# Patient Record
Sex: FEMALE | Race: WHITE | NOT HISPANIC OR LATINO | Employment: FULL TIME | URBAN - METROPOLITAN AREA
[De-identification: names, ages, dates, MRNs, and addresses within clinical notes are randomized per-mention and may not be internally consistent; named-entity substitution may affect disease eponyms.]

---

## 2019-04-16 ENCOUNTER — TRANSCRIBE ORDERS (OUTPATIENT)
Dept: ADMINISTRATIVE | Facility: HOSPITAL | Age: 54
End: 2019-04-16

## 2019-04-16 DIAGNOSIS — M70.70 OTHER BURSITIS OF HIP, UNSPECIFIED HIP: Primary | ICD-10-CM

## 2019-04-23 ENCOUNTER — HOSPITAL ENCOUNTER (OUTPATIENT)
Dept: GENERAL RADIOLOGY | Facility: HOSPITAL | Age: 54
Discharge: HOME | End: 2019-04-23

## 2019-04-23 ENCOUNTER — HOSPITAL ENCOUNTER (OUTPATIENT)
Dept: GENERAL RADIOLOGY | Facility: HOSPITAL | Age: 54
Discharge: HOME | End: 2019-04-23
Admitting: RADIOLOGY

## 2019-04-23 ENCOUNTER — HOSPITAL ENCOUNTER (OUTPATIENT)
Dept: CT IMAGING | Facility: HOSPITAL | Age: 54
Discharge: HOME | End: 2019-04-23

## 2019-04-23 VITALS
OXYGEN SATURATION: 99 % | RESPIRATION RATE: 18 BRPM | DIASTOLIC BLOOD PRESSURE: 73 MMHG | SYSTOLIC BLOOD PRESSURE: 133 MMHG | HEART RATE: 88 BPM

## 2019-04-23 VITALS
SYSTOLIC BLOOD PRESSURE: 128 MMHG | DIASTOLIC BLOOD PRESSURE: 72 MMHG | HEART RATE: 84 BPM | OXYGEN SATURATION: 100 % | RESPIRATION RATE: 20 BRPM

## 2019-04-23 DIAGNOSIS — M70.70 OTHER BURSITIS OF HIP, UNSPECIFIED HIP: ICD-10-CM

## 2019-04-23 PROCEDURE — 77012 CT SCAN FOR NEEDLE BIOPSY: CPT

## 2019-04-23 PROCEDURE — 25010000002 METHYLPREDNISOLONE PER 40 MG: Performed by: INTERNAL MEDICINE

## 2019-04-23 RX ORDER — METHYLPREDNISOLONE ACETATE 40 MG/ML
80 INJECTION, SUSPENSION INTRA-ARTICULAR; INTRALESIONAL; INTRAMUSCULAR; SOFT TISSUE ONCE
Status: COMPLETED | OUTPATIENT
Start: 2019-04-23 | End: 2019-04-23

## 2019-04-23 RX ORDER — LIDOCAINE HYDROCHLORIDE 10 MG/ML
20 INJECTION, SOLUTION INFILTRATION; PERINEURAL ONCE
Status: COMPLETED | OUTPATIENT
Start: 2019-04-23 | End: 2019-04-23

## 2019-04-23 RX ADMIN — LIDOCAINE HYDROCHLORIDE 20 ML: 10 INJECTION, SOLUTION INFILTRATION; PERINEURAL at 12:10

## 2019-04-23 RX ADMIN — LIDOCAINE HYDROCHLORIDE 20 ML: 10 INJECTION, SOLUTION INFILTRATION; PERINEURAL at 12:33

## 2019-04-23 RX ADMIN — METHYLPREDNISOLONE ACETATE 80 MG: 40 INJECTION, SUSPENSION INTRA-ARTICULAR; INTRALESIONAL; INTRAMUSCULAR; SOFT TISSUE at 12:32

## 2019-04-23 RX ADMIN — METHYLPREDNISOLONE ACETATE 80 MG: 40 INJECTION, SUSPENSION INTRA-ARTICULAR; INTRALESIONAL; INTRAMUSCULAR; SOFT TISSUE at 12:22

## 2019-05-08 ENCOUNTER — APPOINTMENT (OUTPATIENT)
Dept: GENERAL RADIOLOGY | Facility: HOSPITAL | Age: 54
End: 2019-05-08

## 2019-10-22 ENCOUNTER — TRANSCRIBE ORDERS (OUTPATIENT)
Dept: LAB | Facility: HOSPITAL | Age: 54
End: 2019-10-22

## 2019-10-22 ENCOUNTER — LAB (OUTPATIENT)
Dept: LAB | Facility: HOSPITAL | Age: 54
End: 2019-10-22

## 2019-10-22 DIAGNOSIS — A60.00 GENITAL HERPES SIMPLEX, UNSPECIFIED SITE: ICD-10-CM

## 2019-10-22 DIAGNOSIS — B00.9 DISEASE DUE TO ALPHAHERPESVIRINAE: Primary | ICD-10-CM

## 2019-10-22 DIAGNOSIS — B00.9 DISEASE DUE TO ALPHAHERPESVIRINAE: ICD-10-CM

## 2019-10-22 DIAGNOSIS — A60.04 HERPETIC VULVOVAGINITIS: ICD-10-CM

## 2019-10-22 LAB
ALBUMIN SERPL-MCNC: 4.7 G/DL (ref 3.5–5.2)
ALBUMIN/GLOB SERPL: 1.7 G/DL
ALP SERPL-CCNC: 55 U/L (ref 39–117)
ALT SERPL W P-5'-P-CCNC: 14 U/L (ref 1–33)
ANION GAP SERPL CALCULATED.3IONS-SCNC: 6 MMOL/L (ref 5–15)
AST SERPL-CCNC: 20 U/L (ref 1–32)
BASOPHILS # BLD AUTO: 0.08 10*3/MM3 (ref 0–0.2)
BASOPHILS NFR BLD AUTO: 1.3 % (ref 0–1.5)
BILIRUB SERPL-MCNC: 1 MG/DL (ref 0.2–1.2)
BUN BLD-MCNC: 14 MG/DL (ref 6–20)
BUN/CREAT SERPL: 21.9 (ref 7–25)
CALCIUM SPEC-SCNC: 9.6 MG/DL (ref 8.6–10.5)
CHLORIDE SERPL-SCNC: 101 MMOL/L (ref 98–107)
CO2 SERPL-SCNC: 34 MMOL/L (ref 22–29)
CREAT BLD-MCNC: 0.64 MG/DL (ref 0.57–1)
DEPRECATED RDW RBC AUTO: 46.6 FL (ref 37–54)
EOSINOPHIL # BLD AUTO: 0.09 10*3/MM3 (ref 0–0.4)
EOSINOPHIL NFR BLD AUTO: 1.5 % (ref 0.3–6.2)
ERYTHROCYTE [DISTWIDTH] IN BLOOD BY AUTOMATED COUNT: 13.2 % (ref 12.3–15.4)
GFR SERPL CREATININE-BSD FRML MDRD: 96 ML/MIN/1.73
GLOBULIN UR ELPH-MCNC: 2.8 GM/DL
GLUCOSE BLD-MCNC: 131 MG/DL (ref 65–99)
HCT VFR BLD AUTO: 43.8 % (ref 34–46.6)
HGB BLD-MCNC: 13.7 G/DL (ref 12–15.9)
IMM GRANULOCYTES # BLD AUTO: 0.01 10*3/MM3 (ref 0–0.05)
IMM GRANULOCYTES NFR BLD AUTO: 0.2 % (ref 0–0.5)
LYMPHOCYTES # BLD AUTO: 1.92 10*3/MM3 (ref 0.7–3.1)
LYMPHOCYTES NFR BLD AUTO: 31.8 % (ref 19.6–45.3)
MCH RBC QN AUTO: 29.9 PG (ref 26.6–33)
MCHC RBC AUTO-ENTMCNC: 31.3 G/DL (ref 31.5–35.7)
MCV RBC AUTO: 95.6 FL (ref 79–97)
MONOCYTES # BLD AUTO: 0.44 10*3/MM3 (ref 0.1–0.9)
MONOCYTES NFR BLD AUTO: 7.3 % (ref 5–12)
NEUTROPHILS # BLD AUTO: 3.5 10*3/MM3 (ref 1.7–7)
NEUTROPHILS NFR BLD AUTO: 57.9 % (ref 42.7–76)
NRBC BLD AUTO-RTO: 0 /100 WBC (ref 0–0.2)
PLATELET # BLD AUTO: 155 10*3/MM3 (ref 140–450)
PMV BLD AUTO: 12.7 FL (ref 6–12)
POTASSIUM BLD-SCNC: 4.4 MMOL/L (ref 3.5–5.2)
PROT SERPL-MCNC: 7.5 G/DL (ref 6–8.5)
RBC # BLD AUTO: 4.58 10*6/MM3 (ref 3.77–5.28)
SODIUM BLD-SCNC: 141 MMOL/L (ref 136–145)
WBC NRBC COR # BLD: 6.04 10*3/MM3 (ref 3.4–10.8)

## 2019-10-22 PROCEDURE — 80053 COMPREHEN METABOLIC PANEL: CPT

## 2019-10-22 PROCEDURE — 85025 COMPLETE CBC W/AUTO DIFF WBC: CPT

## 2019-10-22 PROCEDURE — 36415 COLL VENOUS BLD VENIPUNCTURE: CPT

## 2020-02-04 ENCOUNTER — TRANSCRIBE ORDERS (OUTPATIENT)
Dept: LAB | Facility: HOSPITAL | Age: 55
End: 2020-02-04

## 2020-02-04 ENCOUNTER — LAB (OUTPATIENT)
Dept: LAB | Facility: HOSPITAL | Age: 55
End: 2020-02-04

## 2020-02-04 DIAGNOSIS — A60.00 GENITAL HERPES SIMPLEX, UNSPECIFIED SITE: ICD-10-CM

## 2020-02-04 DIAGNOSIS — A60.04 HERPETIC VULVOVAGINITIS: ICD-10-CM

## 2020-02-04 DIAGNOSIS — B00.9 DISEASE DUE TO ALPHAHERPESVIRINAE: ICD-10-CM

## 2020-02-04 DIAGNOSIS — B00.9 DISEASE DUE TO ALPHAHERPESVIRINAE: Primary | ICD-10-CM

## 2020-02-04 LAB
ALBUMIN SERPL-MCNC: 4.3 G/DL (ref 3.5–5.2)
ALBUMIN/GLOB SERPL: 1.3 G/DL
ALP SERPL-CCNC: 62 U/L (ref 39–117)
ALT SERPL W P-5'-P-CCNC: 14 U/L (ref 1–33)
ANION GAP SERPL CALCULATED.3IONS-SCNC: 10 MMOL/L (ref 5–15)
AST SERPL-CCNC: 19 U/L (ref 1–32)
BASOPHILS # BLD AUTO: 0.05 10*3/MM3 (ref 0–0.2)
BASOPHILS NFR BLD AUTO: 1 % (ref 0–1.5)
BILIRUB SERPL-MCNC: 1 MG/DL (ref 0.2–1.2)
BUN BLD-MCNC: 14 MG/DL (ref 6–20)
BUN/CREAT SERPL: 23.3 (ref 7–25)
CALCIUM SPEC-SCNC: 9.3 MG/DL (ref 8.6–10.5)
CHLORIDE SERPL-SCNC: 100 MMOL/L (ref 98–107)
CO2 SERPL-SCNC: 27 MMOL/L (ref 22–29)
CREAT BLD-MCNC: 0.6 MG/DL (ref 0.57–1)
DEPRECATED RDW RBC AUTO: 48.1 FL (ref 37–54)
EOSINOPHIL # BLD AUTO: 0.09 10*3/MM3 (ref 0–0.4)
EOSINOPHIL NFR BLD AUTO: 1.8 % (ref 0.3–6.2)
ERYTHROCYTE [DISTWIDTH] IN BLOOD BY AUTOMATED COUNT: 13.5 % (ref 12.3–15.4)
GFR SERPL CREATININE-BSD FRML MDRD: 104 ML/MIN/1.73
GLOBULIN UR ELPH-MCNC: 3.2 GM/DL
GLUCOSE BLD-MCNC: 95 MG/DL (ref 65–99)
HCT VFR BLD AUTO: 39.8 % (ref 34–46.6)
HGB BLD-MCNC: 12.9 G/DL (ref 12–15.9)
IMM GRANULOCYTES # BLD AUTO: 0.01 10*3/MM3 (ref 0–0.05)
IMM GRANULOCYTES NFR BLD AUTO: 0.2 % (ref 0–0.5)
LYMPHOCYTES # BLD AUTO: 2 10*3/MM3 (ref 0.7–3.1)
LYMPHOCYTES NFR BLD AUTO: 39.3 % (ref 19.6–45.3)
MCH RBC QN AUTO: 31.4 PG (ref 26.6–33)
MCHC RBC AUTO-ENTMCNC: 32.4 G/DL (ref 31.5–35.7)
MCV RBC AUTO: 96.8 FL (ref 79–97)
MONOCYTES # BLD AUTO: 0.44 10*3/MM3 (ref 0.1–0.9)
MONOCYTES NFR BLD AUTO: 8.6 % (ref 5–12)
NEUTROPHILS # BLD AUTO: 2.5 10*3/MM3 (ref 1.7–7)
NEUTROPHILS NFR BLD AUTO: 49.1 % (ref 42.7–76)
NRBC BLD AUTO-RTO: 0 /100 WBC (ref 0–0.2)
PLATELET # BLD AUTO: 167 10*3/MM3 (ref 140–450)
PMV BLD AUTO: 11.8 FL (ref 6–12)
POTASSIUM BLD-SCNC: 3.8 MMOL/L (ref 3.5–5.2)
PROT SERPL-MCNC: 7.5 G/DL (ref 6–8.5)
RBC # BLD AUTO: 4.11 10*6/MM3 (ref 3.77–5.28)
SODIUM BLD-SCNC: 137 MMOL/L (ref 136–145)
WBC NRBC COR # BLD: 5.09 10*3/MM3 (ref 3.4–10.8)

## 2020-02-04 PROCEDURE — 80053 COMPREHEN METABOLIC PANEL: CPT

## 2020-02-04 PROCEDURE — 85025 COMPLETE CBC W/AUTO DIFF WBC: CPT

## 2020-02-04 PROCEDURE — 36415 COLL VENOUS BLD VENIPUNCTURE: CPT

## 2020-07-27 ENCOUNTER — TELEPHONE (OUTPATIENT)
Dept: INTERNAL MEDICINE | Facility: CLINIC | Age: 55
End: 2020-07-27

## 2020-07-27 NOTE — TELEPHONE ENCOUNTER
PATIENT STATES THAT A FRIEND (HILLARY ORBB OR ANTHONY)  REFERRED HER TO DR VALENCIA AND IS REQUESTING TO BECOME A NEW PATIENT OF HERS.    PLEASE CALL AND ADVISE [phone]

## 2020-07-27 NOTE — TELEPHONE ENCOUNTER
Spoke with Sharon and notified her Dr Mack is out of the office until 7/30/2020 and that we would call her back then.   Verbal understanding given

## 2020-08-13 ENCOUNTER — OFFICE VISIT (OUTPATIENT)
Dept: INTERNAL MEDICINE | Facility: CLINIC | Age: 55
End: 2020-08-13

## 2020-08-13 VITALS
WEIGHT: 125.25 LBS | DIASTOLIC BLOOD PRESSURE: 70 MMHG | RESPIRATION RATE: 20 BRPM | BODY MASS INDEX: 19.66 KG/M2 | HEART RATE: 72 BPM | TEMPERATURE: 98.4 F | SYSTOLIC BLOOD PRESSURE: 122 MMHG | HEIGHT: 67 IN

## 2020-08-13 DIAGNOSIS — M25.50 PAIN IN JOINT, MULTIPLE SITES: ICD-10-CM

## 2020-08-13 DIAGNOSIS — Z23 NEED FOR HEPATITIS A IMMUNIZATION: ICD-10-CM

## 2020-08-13 DIAGNOSIS — M53.3 COCCYX PAIN: ICD-10-CM

## 2020-08-13 DIAGNOSIS — Z13.6 SCREENING FOR CARDIOVASCULAR CONDITION: ICD-10-CM

## 2020-08-13 DIAGNOSIS — M54.6 CHRONIC MIDLINE THORACIC BACK PAIN: Primary | ICD-10-CM

## 2020-08-13 DIAGNOSIS — G89.29 CHRONIC MIDLINE THORACIC BACK PAIN: Primary | ICD-10-CM

## 2020-08-13 PROCEDURE — 90632 HEPA VACCINE ADULT IM: CPT | Performed by: INTERNAL MEDICINE

## 2020-08-13 PROCEDURE — 90471 IMMUNIZATION ADMIN: CPT | Performed by: INTERNAL MEDICINE

## 2020-08-13 PROCEDURE — 99203 OFFICE O/P NEW LOW 30 MIN: CPT | Performed by: INTERNAL MEDICINE

## 2020-08-13 RX ORDER — CYCLOSPORINE 0.5 MG/ML
1 EMULSION OPHTHALMIC 2 TIMES DAILY
COMMUNITY
End: 2020-11-09

## 2020-08-13 RX ORDER — FAMCICLOVIR 500 MG/1
1 TABLET ORAL DAILY
COMMUNITY

## 2020-08-13 NOTE — PROGRESS NOTES
"Subjective       Sharon Turner is a 54 y.o. female.     Chief Complaint   Patient presents with   • Back Pain     Establish care       History obtained from the patient.      History of Present Illness     The patient is establishing care.  She states that she has had recent labs with her previous PCP.    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    The patient has a history of GERD, stable on no medication.    Patient states she was diagnosed with Osteoporosis in June 2020.  She did not feel like she could tolerate oral medication, and Reclast has been ordered by an Endocrinologist.  She is awaiting insurance approval.  She takes Calcium and Vitamin D.  In addition to her Physical Therapy, she walks.    The patient is complaining of a 2-year history of pain in the perianal/rectal area.  She denies injury or trauma.  She states it \"feels like she is sitting on something\".  She states  x-rays were negative.  She saw Neurology who ordered an MRI of her spine and coccyx which were negative.  She saw Rheumatology and was diagnosed with Ischial Bursitis.  On 4/16/2019, she had CT guided bilateral ischial bursal injections without relief.  She saw Dr. Key (Colorectal Surgery).  She had a proctoscopy, colonoscopy, and defecation test which she states were all normal.  She saw GYN and reports she had a normal exam.  She also states she has had a normal CT abdomen and pelvis and a normal pelvic ultrasound.  She tried Meloxicam which caused severe stomach upset.  She also did pelvic floor physical therapy without relief.  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  She states she saw Infectious Disease  to see if this was HSV related, and they felt that it was not.    The patient states she also has Chronic Thoracic Back Pain, which started in January 2020.  She denies injury or trauma.  It does not radiate.  She states she woke up with this pain, along with neck " pain.  The neck pain resolved with PT, but the back pain has persisted.  She states she has had an x-ray and an MRI of her thoracic spine which were normal.  She complains of arthralgias of her hands, shoulders, left hip, knees.  She has myalgias of her legs.  She also has paresthesias of her hands.      Current Outpatient Medications on File Prior to Visit   Medication Sig Dispense Refill   • Calcium-Phosphorus-Vitamin D (CITRACAL +D3 PO) Take 600 mg by mouth Daily.     • COLLAGEN PO Take 3,000 mg by mouth Daily.     • cycloSPORINE (Restasis) 0.05 % ophthalmic emulsion 1 drop 2 (two) times a day.     • Ergocalciferol (VITAMIN D2 PO) Take 12.5 m by mouth 1 (One) Time Per Week.     • famciclovir (FAMVIR) 500 MG tablet 1 tablet 2 (two) times a day.     • Multiple Vitamins-Minerals (OCUVITE-LUTEIN PO) Take 1 tablet by mouth Daily.       No current facility-administered medications on file prior to visit.        Current outpatient and discharge medications have been reconciled for the patient.  Reviewed by: Mirta Mack MD        The following portions of the patient's history were reviewed and updated as appropriate: allergies, current medications, past family history, past medical history, past social history, past surgical history and problem list.    Review of Systems   Constitutional: Positive for fatigue. Negative for chills, fever and unexpected weight change.   Eyes: Negative for visual disturbance.   Respiratory: Negative for cough, shortness of breath and wheezing.    Cardiovascular: Negative for chest pain, palpitations and leg swelling.        No STEVENS, orthopnea, or claudication.   Gastrointestinal: Negative for abdominal pain, blood in stool, constipation, diarrhea, nausea and vomiting.        Denies melena.   Endocrine: Negative for polydipsia and polyuria.   Genitourinary: Positive for urgency (not new). Negative for dysuria, frequency and hematuria.   Musculoskeletal: Negative for arthralgias and  "myalgias.   Skin: Negative for rash.   Neurological: Positive for weakness and numbness (and tingling). Negative for dizziness, syncope, light-headedness and headaches.        No memory issues.   Psychiatric/Behavioral: Negative for decreased concentration and sleep disturbance. The patient is not nervous/anxious.         Denies depression.         Objective       Blood pressure 122/70, pulse 72, temperature 98.4 °F (36.9 °C), temperature source Temporal, resp. rate 20, height 170.2 cm (67\"), weight 56.8 kg (125 lb 4 oz).      Physical Exam   Constitutional: She appears well-developed and well-nourished.   Neck: Normal range of motion. Neck supple. Carotid bruit is not present. No thyromegaly present.   There is no tenderness to palpation of the cervical spine or paraspinal muscles.   Cardiovascular: Normal rate, regular rhythm, normal heart sounds and intact distal pulses. Exam reveals no gallop and no friction rub.   No murmur heard.  No peripheral edema.   Pulmonary/Chest: Effort normal and breath sounds normal.   Abdominal: Soft. Bowel sounds are normal. She exhibits no distension, no abdominal bruit and no mass. There is no hepatosplenomegaly. There is no tenderness.   No CVA tenderness.   Musculoskeletal: Normal range of motion.   There is no tenderness to palpation over the lumbar or thoracic spine or paraspinal muscles.  Straight leg raise is negative bilaterally.  There is no pain with right hip flexion, extension, abduction, and adduction.  There is no pain with left hip flexion, extension, abduction, and adduction.   Neurological: She has normal strength and normal reflexes.   Skin: No rash noted.   Psychiatric: She has a normal mood and affect.   Nursing note and vitals reviewed.      Assessment / Plan:  Sharon was seen today for back pain.    Diagnoses and all orders for this visit:    Chronic midline thoracic back pain  -     NM Bone Scan Whole Body; Future    Coccyx pain  -     NM Bone Scan Whole Body; " Future    Pain in joint, multiple sites  -     NM Bone Scan Whole Body; Future    Screening for cardiovascular condition  -     CT Cardiac Calcium Score Without Dye; Future    Need for hepatitis A immunization  -     Hepatitis A Vaccine Adult IM      Recommended Shingrix (new Shingles vaccine) at the pharmacy.    Return in about 3 months (around 11/13/2020) for Annual physical, fasting.

## 2020-08-18 PROBLEM — M81.0 OSTEOPOROSIS: Status: ACTIVE | Noted: 2020-08-18

## 2020-08-18 PROBLEM — E55.9 VITAMIN D DEFICIENCY: Status: ACTIVE | Noted: 2020-08-18

## 2020-08-18 PROBLEM — I73.00 RAYNAUD'S DISEASE: Status: ACTIVE | Noted: 2019-02-11

## 2020-08-18 PROBLEM — B00.9 HERPES SIMPLEX TYPE 2 INFECTION: Status: ACTIVE | Noted: 2020-08-18

## 2020-08-18 PROBLEM — K21.9 GERD (GASTROESOPHAGEAL REFLUX DISEASE): Status: ACTIVE | Noted: 2020-08-18

## 2020-08-18 PROBLEM — Z87.442 HISTORY OF NEPHROLITHIASIS: Status: ACTIVE | Noted: 2020-08-18

## 2020-08-26 ENCOUNTER — APPOINTMENT (OUTPATIENT)
Dept: NUCLEAR MEDICINE | Facility: HOSPITAL | Age: 55
End: 2020-08-26

## 2020-08-26 ENCOUNTER — APPOINTMENT (OUTPATIENT)
Dept: CT IMAGING | Facility: HOSPITAL | Age: 55
End: 2020-08-26

## 2020-09-02 ENCOUNTER — HOSPITAL ENCOUNTER (OUTPATIENT)
Dept: CT IMAGING | Facility: HOSPITAL | Age: 55
Discharge: HOME | End: 2020-09-02
Admitting: INTERNAL MEDICINE

## 2020-09-02 ENCOUNTER — HOSPITAL ENCOUNTER (OUTPATIENT)
Dept: NUCLEAR MEDICINE | Facility: HOSPITAL | Age: 55
Discharge: HOME | End: 2020-09-02

## 2020-09-02 DIAGNOSIS — M54.6 CHRONIC MIDLINE THORACIC BACK PAIN: ICD-10-CM

## 2020-09-02 DIAGNOSIS — Z13.6 SCREENING FOR CARDIOVASCULAR CONDITION: ICD-10-CM

## 2020-09-02 DIAGNOSIS — M25.50 PAIN IN JOINT, MULTIPLE SITES: ICD-10-CM

## 2020-09-02 DIAGNOSIS — G89.29 CHRONIC MIDLINE THORACIC BACK PAIN: ICD-10-CM

## 2020-09-02 DIAGNOSIS — M53.3 COCCYX PAIN: ICD-10-CM

## 2020-09-02 PROCEDURE — A9503 TC99M MEDRONATE: HCPCS | Performed by: INTERNAL MEDICINE

## 2020-09-02 PROCEDURE — 0 TECHNETIUM MEDRONATE KIT: Performed by: INTERNAL MEDICINE

## 2020-09-02 PROCEDURE — 75571 CT HRT W/O DYE W/CA TEST: CPT

## 2020-09-02 PROCEDURE — 78306 BONE IMAGING WHOLE BODY: CPT

## 2020-09-02 RX ORDER — TC 99M MEDRONATE 20 MG/10ML
27.1 INJECTION, POWDER, LYOPHILIZED, FOR SOLUTION INTRAVENOUS
Status: COMPLETED | OUTPATIENT
Start: 2020-09-02 | End: 2020-09-02

## 2020-09-02 RX ADMIN — Medication 27.1 MILLICURIE: at 11:05

## 2020-09-03 ENCOUNTER — LAB (OUTPATIENT)
Dept: LAB | Facility: HOSPITAL | Age: 55
End: 2020-09-03

## 2020-09-03 ENCOUNTER — TRANSCRIBE ORDERS (OUTPATIENT)
Dept: LAB | Facility: HOSPITAL | Age: 55
End: 2020-09-03

## 2020-09-03 DIAGNOSIS — M54.6 PAIN IN THORACIC SPINE: Primary | ICD-10-CM

## 2020-09-03 DIAGNOSIS — A31.0 PULMONARY DISEASE DUE TO MYCOBACTERIA (HCC): ICD-10-CM

## 2020-09-03 LAB
ALBUMIN SERPL-MCNC: 4.3 G/DL (ref 3.5–5.2)
ALBUMIN/GLOB SERPL: 1.5 G/DL
ALP SERPL-CCNC: 51 U/L (ref 39–117)
ALT SERPL W P-5'-P-CCNC: 10 U/L (ref 1–33)
ANION GAP SERPL CALCULATED.3IONS-SCNC: 8 MMOL/L (ref 5–15)
AST SERPL-CCNC: 18 U/L (ref 1–32)
BASOPHILS # BLD AUTO: 0.06 10*3/MM3 (ref 0–0.2)
BASOPHILS NFR BLD AUTO: 1 % (ref 0–1.5)
BILIRUB SERPL-MCNC: 1.2 MG/DL (ref 0–1.2)
BUN SERPL-MCNC: 18 MG/DL (ref 6–20)
BUN/CREAT SERPL: 25.4 (ref 7–25)
CALCIUM SPEC-SCNC: 9.6 MG/DL (ref 8.6–10.5)
CHLORIDE SERPL-SCNC: 99 MMOL/L (ref 98–107)
CO2 SERPL-SCNC: 31 MMOL/L (ref 22–29)
CREAT SERPL-MCNC: 0.71 MG/DL (ref 0.57–1)
DEPRECATED RDW RBC AUTO: 46.3 FL (ref 37–54)
EOSINOPHIL # BLD AUTO: 0.06 10*3/MM3 (ref 0–0.4)
EOSINOPHIL NFR BLD AUTO: 1 % (ref 0.3–6.2)
ERYTHROCYTE [DISTWIDTH] IN BLOOD BY AUTOMATED COUNT: 13 % (ref 12.3–15.4)
GFR SERPL CREATININE-BSD FRML MDRD: 85 ML/MIN/1.73
GLOBULIN UR ELPH-MCNC: 2.8 GM/DL
GLUCOSE SERPL-MCNC: 120 MG/DL (ref 65–99)
HCT VFR BLD AUTO: 40.2 % (ref 34–46.6)
HGB BLD-MCNC: 12.9 G/DL (ref 12–15.9)
IMM GRANULOCYTES # BLD AUTO: 0.01 10*3/MM3 (ref 0–0.05)
IMM GRANULOCYTES NFR BLD AUTO: 0.2 % (ref 0–0.5)
LYMPHOCYTES # BLD AUTO: 1.64 10*3/MM3 (ref 0.7–3.1)
LYMPHOCYTES NFR BLD AUTO: 28.5 % (ref 19.6–45.3)
MCH RBC QN AUTO: 31.3 PG (ref 26.6–33)
MCHC RBC AUTO-ENTMCNC: 32.1 G/DL (ref 31.5–35.7)
MCV RBC AUTO: 97.6 FL (ref 79–97)
MONOCYTES # BLD AUTO: 0.43 10*3/MM3 (ref 0.1–0.9)
MONOCYTES NFR BLD AUTO: 7.5 % (ref 5–12)
NEUTROPHILS NFR BLD AUTO: 3.55 10*3/MM3 (ref 1.7–7)
NEUTROPHILS NFR BLD AUTO: 61.8 % (ref 42.7–76)
NRBC BLD AUTO-RTO: 0 /100 WBC (ref 0–0.2)
PLATELET # BLD AUTO: 165 10*3/MM3 (ref 140–450)
PMV BLD AUTO: 12 FL (ref 6–12)
POTASSIUM SERPL-SCNC: 3.6 MMOL/L (ref 3.5–5.2)
PROT SERPL-MCNC: 7.1 G/DL (ref 6–8.5)
RBC # BLD AUTO: 4.12 10*6/MM3 (ref 3.77–5.28)
SODIUM SERPL-SCNC: 138 MMOL/L (ref 136–145)
WBC # BLD AUTO: 5.75 10*3/MM3 (ref 3.4–10.8)

## 2020-09-03 PROCEDURE — 80053 COMPREHEN METABOLIC PANEL: CPT | Performed by: INTERNAL MEDICINE

## 2020-09-03 PROCEDURE — 36415 COLL VENOUS BLD VENIPUNCTURE: CPT | Performed by: INTERNAL MEDICINE

## 2020-09-03 PROCEDURE — 85025 COMPLETE CBC W/AUTO DIFF WBC: CPT | Performed by: INTERNAL MEDICINE

## 2020-09-22 ENCOUNTER — TELEPHONE (OUTPATIENT)
Dept: INTERNAL MEDICINE | Facility: CLINIC | Age: 55
End: 2020-09-22

## 2020-09-22 NOTE — TELEPHONE ENCOUNTER
Spoke with Sharon   She is going to call Dr Lux office and see what is going on with the records she requested on 8/12/2020

## 2020-09-22 NOTE — TELEPHONE ENCOUNTER
Call patient please.  I have still not received her medical records.  Can she call her previous PCP office to see what the hold-up is?

## 2020-09-23 ENCOUNTER — TELEPHONE (OUTPATIENT)
Dept: INTERNAL MEDICINE | Facility: CLINIC | Age: 55
End: 2020-09-23

## 2020-09-23 NOTE — TELEPHONE ENCOUNTER
PATIENT CALLED DR ROBERTSON'S OFFICE AND THE MEDICAL RECORDS DEPARTMENT SYSTEM IS CURRENTLY DOWN BUT SAID THAT IF IT WAS SENT IN IN AUGUST IT WOULD HAVE BEEN PROCESSED BUT THEY WERE UNABLE TO CONFIRM.    PATIENT HAS ACCESS TO ALL OF HER MEDICAL RECORDS ONLINE AND WANTED TO KNOW IF YOU NEED HER TO PRINT ALL OF THEM OUT.    PATIENT CALL BACK NUMBER [phone]

## 2020-09-24 NOTE — TELEPHONE ENCOUNTER
She states she is going to call Dr Lux office and see if computer has come back up and check on status of records

## 2020-09-24 NOTE — TELEPHONE ENCOUNTER
Pt called and said that Dr. Lux's office never got our request for records , I refaxed to [phone] this is the # they gave pt to fax to

## 2020-09-29 ENCOUNTER — TELEPHONE (OUTPATIENT)
Dept: INTERNAL MEDICINE | Facility: CLINIC | Age: 55
End: 2020-09-29

## 2020-10-12 ENCOUNTER — TELEPHONE (OUTPATIENT)
Dept: INTERNAL MEDICINE | Facility: CLINIC | Age: 55
End: 2020-10-12

## 2020-10-12 DIAGNOSIS — M53.3 COCCYX PAIN: Primary | ICD-10-CM

## 2020-10-12 NOTE — TELEPHONE ENCOUNTER
Call patient please.  I did receive a few more of her medical records, but not all.  At this point, I would recommend scheduling a CT of her sacrum and coccyx.  I realize she has had an MRI of that area, but CAT scans tend to show bone better.  If she is agreeable, I will enter an order.

## 2020-10-21 ENCOUNTER — TELEPHONE (OUTPATIENT)
Dept: INTERNAL MEDICINE | Facility: CLINIC | Age: 55
End: 2020-10-21

## 2020-10-21 NOTE — TELEPHONE ENCOUNTER
Shannon called and states the patient's CT scan is currently needing a peer to peer review, [phone] and select option #3 CASE #[id number].    Patient is scheduled for 10/22/20 at @ 2:00 pm. Review would need approval before 3:00pm today for patient to still be seen tomorrow.     Please advise?

## 2020-10-22 ENCOUNTER — HOSPITAL ENCOUNTER (OUTPATIENT)
Dept: CT IMAGING | Facility: HOSPITAL | Age: 55
Discharge: HOME | End: 2020-10-22
Admitting: INTERNAL MEDICINE

## 2020-10-22 DIAGNOSIS — M53.3 COCCYX PAIN: ICD-10-CM

## 2020-10-22 PROCEDURE — 72131 CT LUMBAR SPINE W/O DYE: CPT

## 2020-10-23 ENCOUNTER — TELEPHONE (OUTPATIENT)
Dept: INTERNAL MEDICINE | Facility: CLINIC | Age: 55
End: 2020-10-23

## 2020-10-23 DIAGNOSIS — M53.3 COCCYX PAIN: Primary | ICD-10-CM

## 2020-10-23 NOTE — TELEPHONE ENCOUNTER
----- Message from Mirta Mack MD sent at 10/23/2020 12:45 PM EDT -----  Call patient please.  Her CT scan was normal.  I am not sure what other evaluation is left to do for her sacral/coccygeal pain.  Is she interested in a referral to [facility] or [facility]?

## 2020-10-23 NOTE — TELEPHONE ENCOUNTER
Advised pt of message and Pt stated she would like to be referred out to [facility]. Verbalized good understanding.

## 2020-11-05 ENCOUNTER — TELEPHONE (OUTPATIENT)
Dept: INTERNAL MEDICINE | Facility: CLINIC | Age: 55
End: 2020-11-05

## 2020-11-05 NOTE — TELEPHONE ENCOUNTER
PATIENT CALLED AND STATED SHE RECEIVED A CALL FROM [facility] AND THEY ARE REQUESTING THAT THE OFFICE FAX ANY IMAGINE THAT HAS BEEN DONE ON THE PATIENT.     Patient also wants her colonoscopy sent to [facility] as well    [facility] [phone]

## 2020-11-09 ENCOUNTER — OFFICE VISIT (OUTPATIENT)
Dept: INTERNAL MEDICINE | Facility: CLINIC | Age: 55
End: 2020-11-09

## 2020-11-09 VITALS
HEART RATE: 80 BPM | HEIGHT: 67 IN | DIASTOLIC BLOOD PRESSURE: 60 MMHG | RESPIRATION RATE: 20 BRPM | SYSTOLIC BLOOD PRESSURE: 110 MMHG | BODY MASS INDEX: 19.54 KG/M2 | TEMPERATURE: 98 F | WEIGHT: 124.5 LBS

## 2020-11-09 DIAGNOSIS — Z12.31 ENCOUNTER FOR SCREENING MAMMOGRAM FOR BREAST CANCER: ICD-10-CM

## 2020-11-09 DIAGNOSIS — M81.8 OTHER OSTEOPOROSIS WITHOUT CURRENT PATHOLOGICAL FRACTURE: ICD-10-CM

## 2020-11-09 DIAGNOSIS — E55.9 VITAMIN D DEFICIENCY: ICD-10-CM

## 2020-11-09 DIAGNOSIS — Z00.00 ENCOUNTER FOR HEALTH MAINTENANCE EXAMINATION IN ADULT: Primary | ICD-10-CM

## 2020-11-09 DIAGNOSIS — Z13.6 SCREENING FOR CARDIOVASCULAR CONDITION: ICD-10-CM

## 2020-11-09 DIAGNOSIS — Z01.419 ENCOUNTER FOR CERVICAL PAP SMEAR WITH PELVIC EXAM: ICD-10-CM

## 2020-11-09 DIAGNOSIS — Z11.59 NEED FOR HEPATITIS C SCREENING TEST: ICD-10-CM

## 2020-11-09 DIAGNOSIS — K21.9 GASTROESOPHAGEAL REFLUX DISEASE, UNSPECIFIED WHETHER ESOPHAGITIS PRESENT: ICD-10-CM

## 2020-11-09 LAB
25(OH)D3 SERPL-MCNC: 38.8 NG/ML (ref 30–100)
ALBUMIN SERPL-MCNC: 4.6 G/DL (ref 3.5–5.2)
ALBUMIN/GLOB SERPL: 1.6 G/DL
ALP SERPL-CCNC: 56 U/L (ref 39–117)
ALT SERPL W P-5'-P-CCNC: 11 U/L (ref 1–33)
ANION GAP SERPL CALCULATED.3IONS-SCNC: 7.8 MMOL/L (ref 5–15)
AST SERPL-CCNC: 21 U/L (ref 1–32)
BASOPHILS # BLD MANUAL: 0.12 10*3/MM3 (ref 0–0.2)
BASOPHILS NFR BLD AUTO: 2.1 % (ref 0–1.5)
BILIRUB SERPL-MCNC: 1 MG/DL (ref 0–1.2)
BUN SERPL-MCNC: 16 MG/DL (ref 6–20)
BUN/CREAT SERPL: 25 (ref 7–25)
CALCIUM SPEC-SCNC: 9.1 MG/DL (ref 8.6–10.5)
CHLORIDE SERPL-SCNC: 101 MMOL/L (ref 98–107)
CHOLEST SERPL-MCNC: 216 MG/DL (ref 0–200)
CO2 SERPL-SCNC: 29.2 MMOL/L (ref 22–29)
CREAT SERPL-MCNC: 0.64 MG/DL (ref 0.57–1)
DEPRECATED RDW RBC AUTO: 42.7 FL (ref 37–54)
EOSINOPHIL # BLD MANUAL: 0.12 10*3/MM3 (ref 0–0.4)
EOSINOPHIL NFR BLD MANUAL: 2.1 % (ref 0.3–6.2)
ERYTHROCYTE [DISTWIDTH] IN BLOOD BY AUTOMATED COUNT: 12.8 % (ref 12.3–15.4)
GFR SERPL CREATININE-BSD FRML MDRD: 96 ML/MIN/1.73
GLOBULIN UR ELPH-MCNC: 2.8 GM/DL
GLUCOSE SERPL-MCNC: 84 MG/DL (ref 65–99)
HCT VFR BLD AUTO: 40.3 % (ref 34–46.6)
HCV AB SER DONR QL: NORMAL
HDLC SERPL-MCNC: 103 MG/DL (ref 40–60)
HGB BLD-MCNC: 13.5 G/DL (ref 12–15.9)
LDLC SERPL CALC-MCNC: 107 MG/DL (ref 0–100)
LDLC/HDLC SERPL: 1.04 {RATIO}
LYMPHOCYTES # BLD MANUAL: 1.11 10*3/MM3 (ref 0.7–3.1)
LYMPHOCYTES NFR BLD MANUAL: 19.6 % (ref 19.6–45.3)
LYMPHOCYTES NFR BLD MANUAL: 6.2 % (ref 5–12)
MCH RBC QN AUTO: 30.8 PG (ref 26.6–33)
MCHC RBC AUTO-ENTMCNC: 33.5 G/DL (ref 31.5–35.7)
MCV RBC AUTO: 91.8 FL (ref 79–97)
MONOCYTES # BLD AUTO: 0.35 10*3/MM3 (ref 0.1–0.9)
NEUTROPHILS # BLD AUTO: 3.97 10*3/MM3 (ref 1.7–7)
NEUTROPHILS NFR BLD MANUAL: 70.1 % (ref 42.7–76)
PLAT MORPH BLD: NORMAL
PLATELET # BLD AUTO: 141 10*3/MM3 (ref 140–450)
PMV BLD AUTO: 13.7 FL (ref 6–12)
POTASSIUM SERPL-SCNC: 3.8 MMOL/L (ref 3.5–5.2)
PROT SERPL-MCNC: 7.4 G/DL (ref 6–8.5)
RBC # BLD AUTO: 4.39 10*6/MM3 (ref 3.77–5.28)
RBC MORPH BLD: NORMAL
SODIUM SERPL-SCNC: 138 MMOL/L (ref 136–145)
TRIGL SERPL-MCNC: 31 MG/DL (ref 0–150)
TSH SERPL DL<=0.05 MIU/L-ACNC: 1.09 UIU/ML (ref 0.27–4.2)
VLDLC SERPL-MCNC: 6 MG/DL (ref 5–40)
WBC # BLD AUTO: 5.67 10*3/MM3 (ref 3.4–10.8)
WBC MORPH BLD: NORMAL

## 2020-11-09 PROCEDURE — 82306 VITAMIN D 25 HYDROXY: CPT | Performed by: INTERNAL MEDICINE

## 2020-11-09 PROCEDURE — 80053 COMPREHEN METABOLIC PANEL: CPT | Performed by: INTERNAL MEDICINE

## 2020-11-09 PROCEDURE — 84443 ASSAY THYROID STIM HORMONE: CPT | Performed by: INTERNAL MEDICINE

## 2020-11-09 PROCEDURE — 36415 COLL VENOUS BLD VENIPUNCTURE: CPT | Performed by: INTERNAL MEDICINE

## 2020-11-09 PROCEDURE — 86803 HEPATITIS C AB TEST: CPT | Performed by: INTERNAL MEDICINE

## 2020-11-09 PROCEDURE — 85007 BL SMEAR W/DIFF WBC COUNT: CPT | Performed by: INTERNAL MEDICINE

## 2020-11-09 PROCEDURE — 85025 COMPLETE CBC W/AUTO DIFF WBC: CPT | Performed by: INTERNAL MEDICINE

## 2020-11-09 PROCEDURE — 99396 PREV VISIT EST AGE 40-64: CPT | Performed by: INTERNAL MEDICINE

## 2020-11-09 PROCEDURE — 80061 LIPID PANEL: CPT | Performed by: INTERNAL MEDICINE

## 2020-11-09 NOTE — PROGRESS NOTES
Subjective     Chief Complaint:  Physical Exam.    History of Present Illness    History obtained from the patient.    The patient has been referred to the [facility] to evaluate her Chronic Sacral/Coccygeal/Perineal Pain, etiology undetermined despite extensive work-up.  Since last visit, the patient has had a negative bone scan and CT of the sacrum/coccyx.  The patient states her pain is still severe.  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     The patient has a history of GERD, stable on no medication.  She states her GERD flares with antibiotics or other medications.  When that happens, she takes Prilosec over-the-counter for 2 weeks with resolution.  She denies current GI symptoms, and states that she has not had a flare for at least 6 months.    The patient had a Colonoscopy on 5/10/2019 which was negative except for mild Sigmoid Diverticulosis.  Preoperative diagnosis listed history of Colon Polyps.  When questioned regarding this, the patient denies a history of Colon Polyps.     The patient states was diagnosed with Osteoporosis in June 2020.    DEXA scan showed Osteoporosis of both the L-spine and hip per patient report.  She did not feel like she could tolerate oral medication, and Reclast was ordered by her Endocrinologist, but it was not covered by her insurance.  Prolia was discussed, but the patient would need to take oral medication first. She takes Calcium 600 mg with Vitamin D, a Multivitamin (Ocuvite-Lutein), and Vitamin D2 50,000  IU p.o. once monthly.  She has thoracic back pain which has improved significantly with Cortisone injections done at [facility].  She has an appointment next week to discuss her right shoulder pain.  She reports generalized myalgias/arthralgias and intermittent neck pain.  She denies paresthesias, loss of balance, and gait  instability.    The patient states she is seeing an Ophthalmologist at [facility] for deterioration of the vitreous fluid in both eyes, diagnosed in July 2020.  She reports blurred vision, floaters, and burning of the eyes.      Sharon Turner is a 55 y.o. female who presents for an Annual Physical.      The patient presents for a Pap Smear.  She is G [1 ]  P [1 ].  She is menopausal.  Last menstrual period was [2006]. She [denies] dyspareunia.  She [is not currently] sexually active.   Her last Pap smear was [10/10/2018, normal].  She [reports one] abnormal Pap Smears in the past, more than 20 years ago.  She states she had a cone biopsy and cryotherapy without recurrence.  She has HSV2.  She [denies] other sexually-transmitted diseases and HPV in the past.  She [denies] JOHNSON Exposure in utero.      She [does] do self breast exam.  She [denies) breast mass, breast pain, nipple inversion, or nipple discharge.  Last Mammogram was [October 2019, normal per patient].  Last DEXA Scan was June 2020, Osteoporosis].  Last Colonoscopy was [5/10/2019, mild sigmoid diverticulosis].    She [takes] a daily Multivitamin and  [takes] daily Calcium / Vitamin D.      Her paternal uncle had Colon Cancer.  She [denies] a family history of  Colon Polyps, Breast Cancer, Uterine Cancer, Cervical Cancer, or Ovarian Cancer.      PMH, PSH, SocHx, FamHx, Allergies, and Medications: Reviewed and updated.    Outpatient Medications Prior to Visit   Medication Sig Dispense Refill   • Calcium-Phosphorus-Vitamin D (CITRACAL +D3 PO) Take 600 mg by mouth Daily.     • COLLAGEN PO Take 3,000 mg by mouth Daily.     • Ergocalciferol (VITAMIN D2 PO) Take 50,000 Units by mouth Every 30 (Thirty) Days.     • famciclovir (FAMVIR) 500 MG tablet 1 tablet 2 (two) times a day.     • Multiple Vitamins-Minerals (OCUVITE-LUTEIN PO) Take 1 tablet by mouth Daily.     • cycloSPORINE (Restasis) 0.05 % ophthalmic emulsion 1 drop 2 (two) times a day.       No  facility-administered medications prior to visit.        Immunization History   Administered Date(s) Administered   • Flu Vaccine Quad PF >36MO 10/30/2020   • Hepatitis A 08/13/2020   • Tdap 05/14/2008, 10/07/2019         Patient Active Problem List   Diagnosis   • Raynaud's disease   • GERD (gastroesophageal reflux disease)   • Vitamin D deficiency   • History of nephrolithiasis   • Herpes simplex type 2 infection   • Osteoporosis       Health Habits:  Dental Exam. up to date  Eye Exam. up to date  Hearing Loss:  No  Exercise: 7 times/week.  Current exercise activities include: walking  Diet: Healthy  Multivitamin: Yes    Safe Driving:  Yes  Seat Belt:  Yes  Bike Helmet:  N/A  Skin Screening:  Yes  Sunscreen: Yes  SBE / DEVONTE: Yes  Sexual Activity:  Not currently  Birth Control:  Menopause  STD Prevention:  N/A    Last Pap: 10/10/2018, normal  Last Mammogram: October 2019, normal per patient report  Last DEXA Scan: June 2020, Osteoporosis of both the L-spine and hip per patient report.   Last Colonoscopy: 5/10/2019, mild sigmoid diverticulosis  Last PSA: N/A    Social:    Social History     Socioeconomic History   • Marital status:      Spouse name: Not on file   • Number of children: 1   • Years of education: Not on file   • Highest education level: Not on file   Occupational History   • Occupation: Nurse     Comment: full time   Tobacco Use   • Smoking status: Never Smoker   • Smokeless tobacco: Never Used   Substance and Sexual Activity   • Alcohol use: Yes     Comment: 1-2 drinks (beer or cocktail) per week   • Drug use: Never   • Sexual activity: Not Currently     Partners: Male     Birth control/protection: Post-menopausal         Current Medical Providers:    Mirta Mack MD (Internal Medicine / Pediatrics)    The [facility] providers who are involved in the care of this patient are listed above.         Review of Systems   Constitutional: Negative for chills, fatigue, fever and unexpected weight  change.        No night sweats.    HENT: Negative for congestion, ear pain, hearing loss, nosebleeds, postnasal drip, rhinorrhea, sinus pressure, sinus pain, sneezing, sore throat, tinnitus and voice change.         Denies snoring.   Eyes: Positive for pain (burning) and visual disturbance. Negative for photophobia, discharge, redness and itching.   Respiratory: Negative for cough, chest tightness, shortness of breath, wheezing and stridor.         No chest congestion.  No hemoptysis.   Cardiovascular: Negative for chest pain, palpitations and leg swelling.        No orthopnea, STEVENS, or PND.  No claudication or syncope.   Gastrointestinal: Negative for abdominal pain, blood in stool, constipation, diarrhea, nausea, rectal pain and vomiting.        No melena.  No hematemesis.  No heartburn, dysphagia or odynophagia.  No early satiety, belching, or bloating.    Endocrine: Negative for cold intolerance, heat intolerance, polydipsia, polyphagia and polyuria.        No hair loss or dry skin.  No hot flashes.     Genitourinary: Positive for pelvic pain (occasional left). Negative for difficulty urinating, dyspareunia, dysuria, flank pain, frequency, hematuria, urgency, vaginal bleeding and vaginal discharge.        No nocturia, incomplete emptying, or incontinence.   Musculoskeletal: Positive for arthralgias, back pain, myalgias and neck pain. Negative for gait problem, joint swelling and neck stiffness.        No joint stiffness.   Skin: Negative for rash.        No new skin lesions or changes in skin lesions. No breast pain or masses.  No nipple discharge or nipple inversion.   Neurological: Negative for dizziness, tremors, syncope, speech difficulty, weakness, light-headedness, numbness and headaches.        No tingling.  No memory loss.  No decreased concentration.   Hematological: Negative for adenopathy. Does not bruise/bleed easily.   Psychiatric/Behavioral: Negative for confusion, sleep disturbance and suicidal  "ideas. The patient is not nervous/anxious.         No depression.           Objective     Vitals:    11/09/20 0952   BP: 110/60   BP Location: Right arm   Pulse: 80   Resp: 20   Temp: 98 °F (36.7 °C)   TempSrc: Temporal   Weight: 56.5 kg (124 lb 8 oz)   Height: 170.8 cm (67.25\")       Body mass index is 19.35 kg/m².    Physical Exam  Vitals signs and nursing note reviewed. Exam conducted with a chaperone present.   Constitutional:       Appearance: Normal appearance. She is well-developed and normal weight.   HENT:      Head: Normocephalic and atraumatic.      Right Ear: Tympanic membrane, ear canal and external ear normal.      Left Ear: Tympanic membrane, ear canal and external ear normal.      Mouth/Throat:      Mouth: Mucous membranes are moist. No oral lesions.      Pharynx: Oropharynx is clear. No posterior oropharyngeal erythema or uvula swelling.      Comments: Tonsils normal.  Eyes:      General: Lids are normal.      Extraocular Movements: Extraocular movements intact.      Conjunctiva/sclera: Conjunctivae normal.      Pupils: Pupils are equal, round, and reactive to light.   Neck:      Musculoskeletal: Normal range of motion and neck supple.      Thyroid: No thyroid mass or thyromegaly.      Vascular: No carotid bruit.   Cardiovascular:      Rate and Rhythm: Normal rate and regular rhythm.      Pulses: Normal pulses.      Heart sounds: No murmur. No friction rub. No gallop.    Pulmonary:      Effort: Pulmonary effort is normal.      Breath sounds: Normal breath sounds.   Chest:      Breasts:         Right: No inverted nipple, mass, nipple discharge, skin change or tenderness.         Left: No inverted nipple, mass, nipple discharge, skin change or tenderness.   Abdominal:      General: Bowel sounds are normal. There is no distension or abdominal bruit.      Palpations: Abdomen is soft. There is no hepatomegaly, splenomegaly or mass.      Tenderness: There is no abdominal tenderness.      Comments: Rectal " exam deferred.   Genitourinary:     Exam position: Prone.      Labia:         Right: No rash or lesion.         Left: No rash or lesion.       Vagina: Normal. No vaginal discharge.      Cervix: No cervical motion tenderness, discharge or friability.      Uterus: Normal. Not tender.       Adnexa:         Right: No mass or tenderness.          Left: No mass or tenderness.        Comments: Cervix is without lesions or erythema.  No uterine masses.  Musculoskeletal: Normal range of motion.         General: No tenderness.      Right lower leg: No edema.      Left lower leg: No edema.   Lymphadenopathy:      Cervical: No cervical adenopathy.      Upper Body:      Right upper body: No supraclavicular or axillary adenopathy.      Left upper body: No supraclavicular or axillary adenopathy.      Lower Body: No right inguinal adenopathy. No left inguinal adenopathy.   Skin:     Findings: No lesion or rash.      Comments: No atypical skin lesions.   Neurological:      Mental Status: She is alert.      Cranial Nerves: Cranial nerves are intact.      Motor: Motor function is intact. No abnormal muscle tone.      Coordination: Coordination is intact.      Gait: Gait normal.      Deep Tendon Reflexes: Reflexes are normal and symmetric.   Psychiatric:         Mood and Affect: Mood normal.         PHQ-2 Depression Screening  Little interest or pleasure in doing things? 0   Feeling down, depressed, or hopeless? 0   PHQ-2 Total Score 0         Counseling was given to patient for the following topics:  appropriate exercise, healthy eating habits, disease prevention, risk factors for cancer, importance of self breast exam and breast health, importance of immunizations, including risks and benefits, bone health, sun safety, seatbelt use and safe driving. Also discussed the importance of regular dental and vision care, as well recommendation for a yearly screening skin exam after age 40.  Written information provided to patient on these  topics and other health maintenance issues.      Assessment/Plan       Diagnoses and all orders for this visit:    1. Encounter for health maintenance examination in adult (Primary)  -     Lipid Panel  -     Comprehensive Metabolic Panel  -     TSH  -     Vitamin D 25 Hydroxy  -     CBC & Differential  -     Hepatitis C Antibody  -     CBC Auto Differential    2. Encounter for cervical Pap smear with pelvic exam  -     Liquid-based Pap Smear, Screening; Future    3. Gastroesophageal reflux disease, unspecified whether esophagitis present   Stable, no medication.    4. Other osteoporosis without current pathological fracture  -     Vitamin D 25 Hydroxy   Continue Calcium and Vitamin D supplementation, as well as weight bearing exercises.    5. Vitamin D deficiency  -     Vitamin D 25 Hydroxy   Continue Vitamin D supplementation.    6. Screening for cardiovascular condition  -     Lipid Panel  -     Comprehensive Metabolic Panel  -     TSH  -     Vitamin D 25 Hydroxy  -     CBC & Differential  -     CBC Auto Differential    7. Need for hepatitis C screening test  -     Hepatitis C Antibody    8. Encounter for screening mammogram for breast cancer  -     Mammo Screening Digital Tomosynthesis Bilateral With CAD; Future        Recommended Shingrix (new Shingles vaccine) vaccination at the pharmacy.      Return in about 1 year (around 11/9/2021) for Annual physical, fasting.

## 2020-11-09 NOTE — PATIENT INSTRUCTIONS
I recommend Shingrix (new Shingles vaccine) vaccination at the pharmacy.      Health Maintenance for Postmenopausal Women  Menopause is a normal process in which your ability to get pregnant comes to an end. This process happens slowly over many months or years, usually between the ages of 48 and 55. Menopause is complete when you have missed your menstrual periods for 12 months.  It is important to talk with your health care provider about some of the most common conditions that affect women after menopause (postmenopausal women). These include heart disease, cancer, and bone loss (osteoporosis). Adopting a healthy lifestyle and getting preventive care can help to promote your health and wellness. The actions you take can also lower your chances of developing some of these common conditions.  What should I know about menopause?  During menopause, you may get a number of symptoms, such as:  · Hot flashes. These can be moderate or severe.  · Night sweats.  · Decrease in sex drive.  · Mood swings.  · Headaches.  · Tiredness.  · Irritability.  · Memory problems.  · Insomnia.  Choosing to treat or not to treat these symptoms is a decision that you make with your health care provider.  Do I need hormone replacement therapy?  · Hormone replacement therapy is effective in treating symptoms that are caused by menopause, such as hot flashes and night sweats.  · Hormone replacement carries certain risks, especially as you become older. If you are thinking about using estrogen or estrogen with progestin, discuss the benefits and risks with your health care provider.  What is my risk for heart disease and stroke?  The risk of heart disease, heart attack, and stroke increases as you age. One of the causes may be a change in the body's hormones during menopause. This can affect how your body uses dietary fats, triglycerides, and cholesterol. Heart attack and stroke are medical emergencies. There are many things that you can do to  help prevent heart disease and stroke.  Watch your blood pressure  · High blood pressure causes heart disease and increases the risk of stroke. This is more likely to develop in people who have high blood pressure readings, are of  descent, or are overweight.  · Have your blood pressure checked:  ? Every 3-5 years if you are 18-39 years of age.  ? Every year if you are 40 years old or older.  Eat a healthy diet    · Eat a diet that includes plenty of vegetables, fruits, low-fat dairy products, and lean protein.  · Do not eat a lot of foods that are high in solid fats, added sugars, or sodium.  Get regular exercise  Get regular exercise. This is one of the most important things you can do for your health. Most adults should:  · Try to exercise for at least 150 minutes each week. The exercise should increase your heart rate and make you sweat (moderate-intensity exercise).  · Try to do strengthening exercises at least twice each week. Do these in addition to the moderate-intensity exercise.  · Spend less time sitting. Even light physical activity can be beneficial.  Other tips  · Work with your health care provider to achieve or maintain a healthy weight.  · Do not use any products that contain nicotine or tobacco, such as cigarettes, e-cigarettes, and chewing tobacco. If you need help quitting, ask your health care provider.  · Know your numbers. Ask your health care provider to check your cholesterol and your blood sugar (glucose). Continue to have your blood tested as directed by your health care provider.  Do I need screening for cancer?  Depending on your health history and family history, you may need to have cancer screening at different stages of your life. This may include screening for:  · Breast cancer.  · Cervical cancer.  · Lung cancer.  · Colorectal cancer.  What is my risk for osteoporosis?  After menopause, you may be at increased risk for osteoporosis. Osteoporosis is a condition in which bone  destruction happens more quickly than new bone creation. To help prevent osteoporosis or the bone fractures that can happen because of osteoporosis, you may take the following actions:  · If you are 19-50 years old, get at least 1,000 mg of calcium and at least 600 mg of vitamin D per day.  · If you are older than age 50 but younger than age 70, get at least 1,200 mg of calcium and at least 600 mg of vitamin D per day.  · If you are older than age 70, get at least 1,200 mg of calcium and at least 800 mg of vitamin D per day.  Smoking and drinking excessive alcohol increase the risk of osteoporosis. Eat foods that are rich in calcium and vitamin D, and do weight-bearing exercises several times each week as directed by your health care provider.  How does menopause affect my mental health?  Depression may occur at any age, but it is more common as you become older. Common symptoms of depression include:  · Low or sad mood.  · Changes in sleep patterns.  · Changes in appetite or eating patterns.  · Feeling an overall lack of motivation or enjoyment of activities that you previously enjoyed.  · Frequent crying spells.  Talk with your health care provider if you think that you are experiencing depression.  General instructions  See your health care provider for regular wellness exams and vaccines. This may include:  · Scheduling regular health, dental, and eye exams.  · Getting and maintaining your vaccines. These include:  ? Influenza vaccine. Get this vaccine each year before the flu season begins.  ? Pneumonia vaccine.  ? Shingles vaccine.  ? Tetanus, diphtheria, and pertussis (Tdap) booster vaccine.  Your health care provider may also recommend other immunizations.  Tell your health care provider if you have ever been abused or do not feel safe at home.  Summary  · Menopause is a normal process in which your ability to get pregnant comes to an end.  · This condition causes hot flashes, night sweats, decreased  interest in sex, mood swings, headaches, or lack of sleep.  · Treatment for this condition may include hormone replacement therapy.  · Take actions to keep yourself healthy, including exercising regularly, eating a healthy diet, watching your weight, and checking your blood pressure and blood sugar levels.  · Get screened for cancer and depression. Make sure that you are up to date with all your vaccines.  This information is not intended to replace advice given to you by your health care provider. Make sure you discuss any questions you have with your health care provider.  Document Released: 02/09/2007 Document Revised: 12/11/2019 Document Reviewed: 12/11/2019  [employer] Patient Education © 2020 [employer]

## 2020-12-17 DIAGNOSIS — Z01.419 ENCOUNTER FOR CERVICAL PAP SMEAR WITH PELVIC EXAM: ICD-10-CM

## 2021-01-23 ENCOUNTER — HOSPITAL ENCOUNTER (OUTPATIENT)
Dept: MAMMOGRAPHY | Facility: HOSPITAL | Age: 56
Discharge: HOME | End: 2021-01-23
Admitting: INTERNAL MEDICINE

## 2021-01-23 DIAGNOSIS — Z12.31 ENCOUNTER FOR SCREENING MAMMOGRAM FOR BREAST CANCER: ICD-10-CM

## 2021-01-23 PROCEDURE — 77063 BREAST TOMOSYNTHESIS BI: CPT | Performed by: RADIOLOGY

## 2021-01-23 PROCEDURE — 77063 BREAST TOMOSYNTHESIS BI: CPT

## 2021-01-23 PROCEDURE — 77067 SCR MAMMO BI INCL CAD: CPT

## 2021-01-23 PROCEDURE — 77067 SCR MAMMO BI INCL CAD: CPT | Performed by: RADIOLOGY

## 2021-03-04 ENCOUNTER — TRANSCRIBE ORDERS (OUTPATIENT)
Dept: LAB | Facility: HOSPITAL | Age: 56
End: 2021-03-04

## 2021-03-04 ENCOUNTER — LAB (OUTPATIENT)
Dept: LAB | Facility: HOSPITAL | Age: 56
End: 2021-03-04

## 2021-03-04 DIAGNOSIS — A60.00 GENITAL HERPES SIMPLEX, UNSPECIFIED SITE: ICD-10-CM

## 2021-03-04 DIAGNOSIS — A60.04 HERPETIC VULVOVAGINITIS: ICD-10-CM

## 2021-03-04 DIAGNOSIS — B00.9 DISEASE DUE TO ALPHAHERPESVIRINAE: Primary | ICD-10-CM

## 2021-03-04 DIAGNOSIS — B00.9 DISEASE DUE TO ALPHAHERPESVIRINAE: ICD-10-CM

## 2021-03-04 LAB
ALBUMIN SERPL-MCNC: 4.5 G/DL (ref 3.5–5.2)
ALBUMIN/GLOB SERPL: 1.7 G/DL
ALP SERPL-CCNC: 55 U/L (ref 39–117)
ALT SERPL W P-5'-P-CCNC: 14 U/L (ref 1–33)
ANION GAP SERPL CALCULATED.3IONS-SCNC: 6 MMOL/L (ref 5–15)
AST SERPL-CCNC: 21 U/L (ref 1–32)
BASOPHILS # BLD AUTO: 0.07 10*3/MM3 (ref 0–0.2)
BASOPHILS NFR BLD AUTO: 0.9 % (ref 0–1.5)
BILIRUB SERPL-MCNC: 1.2 MG/DL (ref 0–1.2)
BUN SERPL-MCNC: 21 MG/DL (ref 6–20)
BUN/CREAT SERPL: 30.4 (ref 7–25)
CALCIUM SPEC-SCNC: 9.5 MG/DL (ref 8.6–10.5)
CHLORIDE SERPL-SCNC: 99 MMOL/L (ref 98–107)
CO2 SERPL-SCNC: 31 MMOL/L (ref 22–29)
CREAT SERPL-MCNC: 0.69 MG/DL (ref 0.57–1)
DEPRECATED RDW RBC AUTO: 48.3 FL (ref 37–54)
EOSINOPHIL # BLD AUTO: 0.13 10*3/MM3 (ref 0–0.4)
EOSINOPHIL NFR BLD AUTO: 1.7 % (ref 0.3–6.2)
ERYTHROCYTE [DISTWIDTH] IN BLOOD BY AUTOMATED COUNT: 13.5 % (ref 12.3–15.4)
GFR SERPL CREATININE-BSD FRML MDRD: 88 ML/MIN/1.73
GLOBULIN UR ELPH-MCNC: 2.6 GM/DL
GLUCOSE SERPL-MCNC: 97 MG/DL (ref 65–99)
HCT VFR BLD AUTO: 40.7 % (ref 34–46.6)
HGB BLD-MCNC: 12.9 G/DL (ref 12–15.9)
IMM GRANULOCYTES # BLD AUTO: 0.02 10*3/MM3 (ref 0–0.05)
IMM GRANULOCYTES NFR BLD AUTO: 0.3 % (ref 0–0.5)
LYMPHOCYTES # BLD AUTO: 1.85 10*3/MM3 (ref 0.7–3.1)
LYMPHOCYTES NFR BLD AUTO: 24.4 % (ref 19.6–45.3)
MCH RBC QN AUTO: 30.9 PG (ref 26.6–33)
MCHC RBC AUTO-ENTMCNC: 31.7 G/DL (ref 31.5–35.7)
MCV RBC AUTO: 97.4 FL (ref 79–97)
MONOCYTES # BLD AUTO: 0.45 10*3/MM3 (ref 0.1–0.9)
MONOCYTES NFR BLD AUTO: 5.9 % (ref 5–12)
NEUTROPHILS NFR BLD AUTO: 5.06 10*3/MM3 (ref 1.7–7)
NEUTROPHILS NFR BLD AUTO: 66.8 % (ref 42.7–76)
NRBC BLD AUTO-RTO: 0 /100 WBC (ref 0–0.2)
PLATELET # BLD AUTO: 165 10*3/MM3 (ref 140–450)
PMV BLD AUTO: 11.9 FL (ref 6–12)
POTASSIUM SERPL-SCNC: 3.8 MMOL/L (ref 3.5–5.2)
PROT SERPL-MCNC: 7.1 G/DL (ref 6–8.5)
RBC # BLD AUTO: 4.18 10*6/MM3 (ref 3.77–5.28)
SODIUM SERPL-SCNC: 136 MMOL/L (ref 136–145)
WBC # BLD AUTO: 7.58 10*3/MM3 (ref 3.4–10.8)

## 2021-03-04 PROCEDURE — 80053 COMPREHEN METABOLIC PANEL: CPT

## 2021-03-04 PROCEDURE — 36415 COLL VENOUS BLD VENIPUNCTURE: CPT

## 2021-03-04 PROCEDURE — 85025 COMPLETE CBC W/AUTO DIFF WBC: CPT

## 2021-09-07 ENCOUNTER — TRANSCRIBE ORDERS (OUTPATIENT)
Dept: LAB | Facility: HOSPITAL | Age: 56
End: 2021-09-07

## 2021-09-07 ENCOUNTER — LAB (OUTPATIENT)
Dept: LAB | Facility: HOSPITAL | Age: 56
End: 2021-09-07

## 2021-09-07 DIAGNOSIS — A60.04 HERPETIC VULVOVAGINITIS: ICD-10-CM

## 2021-09-07 DIAGNOSIS — A60.00 GENITAL HERPES SIMPLEX, UNSPECIFIED SITE: ICD-10-CM

## 2021-09-07 DIAGNOSIS — G54.1 LUMBOSACRAL PLEXUS LESION: Primary | ICD-10-CM

## 2021-09-07 DIAGNOSIS — B00.9 DISEASE DUE TO ALPHAHERPESVIRINAE: ICD-10-CM

## 2021-09-07 DIAGNOSIS — G54.1 LUMBOSACRAL PLEXUS LESION: ICD-10-CM

## 2021-09-07 LAB
ALBUMIN SERPL-MCNC: 4.5 G/DL (ref 3.5–5.2)
ALBUMIN/GLOB SERPL: 1.7 G/DL
ALP SERPL-CCNC: 56 U/L (ref 39–117)
ALT SERPL W P-5'-P-CCNC: 11 U/L (ref 1–33)
ANION GAP SERPL CALCULATED.3IONS-SCNC: 10 MMOL/L (ref 5–15)
AST SERPL-CCNC: 17 U/L (ref 1–32)
BASOPHILS # BLD AUTO: 0.05 10*3/MM3 (ref 0–0.2)
BASOPHILS NFR BLD AUTO: 0.8 % (ref 0–1.5)
BILIRUB SERPL-MCNC: 1 MG/DL (ref 0–1.2)
BUN SERPL-MCNC: 19 MG/DL (ref 6–20)
BUN/CREAT SERPL: 25.3 (ref 7–25)
CALCIUM SPEC-SCNC: 9.6 MG/DL (ref 8.6–10.5)
CHLORIDE SERPL-SCNC: 100 MMOL/L (ref 98–107)
CO2 SERPL-SCNC: 28 MMOL/L (ref 22–29)
CREAT SERPL-MCNC: 0.75 MG/DL (ref 0.57–1)
DEPRECATED RDW RBC AUTO: 47.8 FL (ref 37–54)
EOSINOPHIL # BLD AUTO: 0.06 10*3/MM3 (ref 0–0.4)
EOSINOPHIL NFR BLD AUTO: 0.9 % (ref 0.3–6.2)
ERYTHROCYTE [DISTWIDTH] IN BLOOD BY AUTOMATED COUNT: 13.5 % (ref 12.3–15.4)
GFR SERPL CREATININE-BSD FRML MDRD: 80 ML/MIN/1.73
GLOBULIN UR ELPH-MCNC: 2.7 GM/DL
GLUCOSE SERPL-MCNC: 92 MG/DL (ref 65–99)
HCT VFR BLD AUTO: 42.1 % (ref 34–46.6)
HGB BLD-MCNC: 13.7 G/DL (ref 12–15.9)
IMM GRANULOCYTES # BLD AUTO: 0.01 10*3/MM3 (ref 0–0.05)
IMM GRANULOCYTES NFR BLD AUTO: 0.2 % (ref 0–0.5)
LYMPHOCYTES # BLD AUTO: 1.94 10*3/MM3 (ref 0.7–3.1)
LYMPHOCYTES NFR BLD AUTO: 30 % (ref 19.6–45.3)
MCH RBC QN AUTO: 30.9 PG (ref 26.6–33)
MCHC RBC AUTO-ENTMCNC: 32.5 G/DL (ref 31.5–35.7)
MCV RBC AUTO: 95 FL (ref 79–97)
MONOCYTES # BLD AUTO: 0.47 10*3/MM3 (ref 0.1–0.9)
MONOCYTES NFR BLD AUTO: 7.3 % (ref 5–12)
NEUTROPHILS NFR BLD AUTO: 3.93 10*3/MM3 (ref 1.7–7)
NEUTROPHILS NFR BLD AUTO: 60.8 % (ref 42.7–76)
NRBC BLD AUTO-RTO: 0 /100 WBC (ref 0–0.2)
PLATELET # BLD AUTO: 164 10*3/MM3 (ref 140–450)
PMV BLD AUTO: 12.7 FL (ref 6–12)
POTASSIUM SERPL-SCNC: 4 MMOL/L (ref 3.5–5.2)
PROT SERPL-MCNC: 7.2 G/DL (ref 6–8.5)
RBC # BLD AUTO: 4.43 10*6/MM3 (ref 3.77–5.28)
SODIUM SERPL-SCNC: 138 MMOL/L (ref 136–145)
WBC # BLD AUTO: 6.46 10*3/MM3 (ref 3.4–10.8)

## 2021-09-07 PROCEDURE — 80053 COMPREHEN METABOLIC PANEL: CPT

## 2021-09-07 PROCEDURE — 36415 COLL VENOUS BLD VENIPUNCTURE: CPT

## 2021-09-07 PROCEDURE — 85025 COMPLETE CBC W/AUTO DIFF WBC: CPT

## 2021-11-08 ENCOUNTER — TELEPHONE (OUTPATIENT)
Dept: INTERNAL MEDICINE | Facility: CLINIC | Age: 56
End: 2021-11-08

## 2021-11-08 NOTE — TELEPHONE ENCOUNTER
Patient states her annual physical appt isn't until 2:30pm and she would like the labs to be placed prior to her appt since she will need to be fasting.

## 2021-11-09 NOTE — TELEPHONE ENCOUNTER
Spoke to patient mentioned message being forwarded to  when she returns to office next week. Patient has had no new problems since last visit. Patient requested to have Vitamin D levels checked. Verbal understanding given.

## 2021-11-15 DIAGNOSIS — M81.8 OTHER OSTEOPOROSIS WITHOUT CURRENT PATHOLOGICAL FRACTURE: ICD-10-CM

## 2021-11-15 DIAGNOSIS — Z13.6 SCREENING FOR CARDIOVASCULAR CONDITION: ICD-10-CM

## 2021-11-15 DIAGNOSIS — E55.9 VITAMIN D DEFICIENCY: ICD-10-CM

## 2021-11-15 DIAGNOSIS — Z00.00 ENCOUNTER FOR HEALTH MAINTENANCE EXAMINATION IN ADULT: Primary | ICD-10-CM

## 2021-11-15 NOTE — TELEPHONE ENCOUNTER
Tried to call patient no answer LVM advising patient that orders were placed and she can come in anytime Monday thru Friday between 8-4 to have labs done.

## 2021-11-22 DIAGNOSIS — M79.676 PAIN AND SWELLING OF TOE, UNSPECIFIED LATERALITY: Primary | ICD-10-CM

## 2021-11-22 DIAGNOSIS — M79.89 PAIN AND SWELLING OF TOE, UNSPECIFIED LATERALITY: Primary | ICD-10-CM

## 2021-12-08 ENCOUNTER — LAB (OUTPATIENT)
Dept: LAB | Facility: HOSPITAL | Age: 56
End: 2021-12-08

## 2021-12-08 ENCOUNTER — OFFICE VISIT (OUTPATIENT)
Dept: INTERNAL MEDICINE | Facility: CLINIC | Age: 56
End: 2021-12-08

## 2021-12-08 VITALS
SYSTOLIC BLOOD PRESSURE: 124 MMHG | RESPIRATION RATE: 18 BRPM | HEART RATE: 76 BPM | WEIGHT: 124.5 LBS | DIASTOLIC BLOOD PRESSURE: 77 MMHG | BODY MASS INDEX: 19.54 KG/M2 | TEMPERATURE: 97.7 F | HEIGHT: 67 IN

## 2021-12-08 DIAGNOSIS — M81.8 OTHER OSTEOPOROSIS WITHOUT CURRENT PATHOLOGICAL FRACTURE: ICD-10-CM

## 2021-12-08 DIAGNOSIS — K21.9 GASTROESOPHAGEAL REFLUX DISEASE, UNSPECIFIED WHETHER ESOPHAGITIS PRESENT: ICD-10-CM

## 2021-12-08 DIAGNOSIS — Z13.6 SCREENING FOR CARDIOVASCULAR CONDITION: ICD-10-CM

## 2021-12-08 DIAGNOSIS — E55.9 VITAMIN D DEFICIENCY: ICD-10-CM

## 2021-12-08 DIAGNOSIS — M79.676 PAIN AND SWELLING OF TOE, UNSPECIFIED LATERALITY: ICD-10-CM

## 2021-12-08 DIAGNOSIS — Z00.00 ENCOUNTER FOR HEALTH MAINTENANCE EXAMINATION IN ADULT: ICD-10-CM

## 2021-12-08 DIAGNOSIS — Z00.00 ENCOUNTER FOR HEALTH MAINTENANCE EXAMINATION IN ADULT: Primary | ICD-10-CM

## 2021-12-08 DIAGNOSIS — Z23 NEED FOR HEPATITIS A IMMUNIZATION: ICD-10-CM

## 2021-12-08 DIAGNOSIS — M79.89 PAIN AND SWELLING OF TOE, UNSPECIFIED LATERALITY: ICD-10-CM

## 2021-12-08 PROCEDURE — 90632 HEPA VACCINE ADULT IM: CPT | Performed by: INTERNAL MEDICINE

## 2021-12-08 PROCEDURE — 80061 LIPID PANEL: CPT

## 2021-12-08 PROCEDURE — 90471 IMMUNIZATION ADMIN: CPT | Performed by: INTERNAL MEDICINE

## 2021-12-08 PROCEDURE — 82306 VITAMIN D 25 HYDROXY: CPT

## 2021-12-08 PROCEDURE — 84443 ASSAY THYROID STIM HORMONE: CPT

## 2021-12-08 PROCEDURE — 84550 ASSAY OF BLOOD/URIC ACID: CPT

## 2021-12-08 PROCEDURE — 85025 COMPLETE CBC W/AUTO DIFF WBC: CPT

## 2021-12-08 PROCEDURE — 80053 COMPREHEN METABOLIC PANEL: CPT

## 2021-12-08 PROCEDURE — 99396 PREV VISIT EST AGE 40-64: CPT | Performed by: INTERNAL MEDICINE

## 2021-12-08 RX ORDER — ESTRADIOL 10 UG/1
1 INSERT VAGINAL 2 TIMES WEEKLY
COMMUNITY
Start: 2021-10-01 | End: 2022-02-01

## 2021-12-08 NOTE — PROGRESS NOTES
Subjective     Chief Complaint:  Physical Exam.    History of Present Illness    History obtained from the patient.    The patient had been referred to the [facility] to evaluate her Chronic Sacral/Coccygeal/Perineal Pain, etiology undetermined despite extensive work-up.  She states she did not end up going due to the Covid 19 Pandemic.  She saw GYN again on 10/12/2021, and they felt this was nerve pain.  They have referred her back to Dr. Verdugo, [facility].  She has an appointment on 12/23/2021.    Of note, the patient states she had a cervical polyp removed by Dr. Vernon, and it was benign    The patient has a history of HSV-2 and is on Famvir 500 mg twice daily.     GERD Follow-Up: The patient is here for follow-up of Gastroesophageal Reflux Disease, which has been stable.   Interval Events: She states her GERD flares with antibiotics or other medications.    Symptoms: Denies abdominal pain, heartburn, acid reflux, nausea, vomiting, hematemesis, dysphagia, odynophagia, hematochezia, melena, early satiety, belching, and bloating.   Associated Symptoms: Denies chronic sore throat, cough, hoarseness, wheezing.   Medication: Prilosec as needed.      Osteoporosis Follow-Up: The patient is here for follow-up of Osteoporosis diagnosed in June 2020, per her report.   Interval Events:  DEXA scan done in June 2020 showed Osteoporosis of both the L-spine and hip per patient report.  She did not feel like she could tolerate oral medication, and Reclast was ordered by her Endocrinologist, but it was not covered by her insurance.  Prolia was discussed, but the patient would need to take oral medication first. She states she had another DEXA scan on 8/12/2021 and her Osteoporosis had improved to Osteopenia. On 11/9/2020, Vitamin D level was 38.8.   Symptoms: Has chronic back pain, neck pain, arthralgias, and paresthesias. Denies myalgias, hip pain, wrist pain, loss of balance, and gait  instability.   Medication: She takes Calcium 600 mg with Vitamin D, a Multivitamin (Ocuvite-Lutein), and Vitamin D2 50,000  IU p.o. once monthly.        Sharon Turner is a 56 y.o. female who presents for an Annual Physical. She had her fasting labs done earlier today.    PMH, PSH, SocHx, FamHx, Allergies, and Medications: Reviewed and updated.    Outpatient Medications Prior to Visit   Medication Sig Dispense Refill   • Calcium-Phosphorus-Vitamin D (CITRACAL +D3 PO) Take 600 mg by mouth Daily.     • Ergocalciferol (VITAMIN D2 PO) Take 50,000 Units by mouth Every 30 (Thirty) Days.     • famciclovir (FAMVIR) 500 MG tablet 1 tablet Daily.     • Multiple Vitamins-Minerals (OCUVITE-LUTEIN PO) Take 1 tablet by mouth Daily.     • estradiol (VAGIFEM) 10 MCG tablet vaginal tablet 1 tablet 2 (Two) Times a Week.     • COLLAGEN PO Take 3,000 mg by mouth Daily.       No facility-administered medications prior to visit.       Immunization History   Administered Date(s) Administered   • COVID-19 (MODERNA) 1st, 2nd, 3rd Dose Only 12/23/2020, 01/22/2021, 11/15/2021   • Flu Vaccine Quad PF >36MO 10/30/2020   • Hepatitis A 08/13/2020   • Tdap 05/14/2008, 10/07/2019         Patient Active Problem List   Diagnosis   • Raynaud's disease   • GERD (gastroesophageal reflux disease)   • Vitamin D deficiency   • History of nephrolithiasis   • Herpes simplex type 2 infection   • Osteoporosis   • Visual problems       Health Habits:  Dental Exam. up to date  Eye Exam. up to date  Hearing Loss:  No  Exercise: 3-4 times/week.   Current exercise activities include: walking.   Also walks a lot at work  Diet: Healthy  Multivitamin: Occuvite occasionally    Safe Driving:  Yes  Seat Belt:  Yes  Bike Helmet:  N/A  Skin Screening:  Yes  Sunscreen: Yes  SBE / DEVONTE: Yes  Sexual Activity:  Not currently  Birth Control:  Menopause  STD Prevention:  N/A    Last Pap: 11/9/2020, normal per patient (GYN).  Also had a normal pelvic exam 2 months ago per  GYN.  Last Mammogram: 1/22/2021, category 1.  Last DEXA Scan: June 2020, osteoporosis per patient report.  Last Colonoscopy: 5/10/2019, negative except mild diverticulosis and mildly hypertrophied anal papillae  Last PSA: N/A    Social:    Social History     Socioeconomic History   • Marital status:    • Number of children: 1   Tobacco Use   • Smoking status: Never Smoker   • Smokeless tobacco: Never Used   Vaping Use   • Vaping Use: Never used   Substance and Sexual Activity   • Alcohol use: Yes     Comment: 1-2 drinks (beer or cocktail) per week   • Drug use: Never   • Sexual activity: Not Currently     Partners: Male     Birth control/protection: Post-menopausal         Current Medical Providers:    Mirta Mack MD (Internal Medicine / Pediatrics)    The [facility] providers who are involved in the care of this patient are listed above.         Review of Systems   Constitutional: Negative for chills, fatigue, fever and unexpected weight change.        No night sweats.    HENT: Negative for congestion, ear pain, hearing loss, nosebleeds, postnasal drip, rhinorrhea, sinus pressure, sinus pain, sneezing, sore throat, tinnitus and voice change.         Denies snoring.   Eyes: Positive for visual disturbance ( blurred vision, floaters, and burning of the eyes- due to bilateral vitreous deterioration.   ). Negative for photophobia, pain, discharge, redness and itching.   Respiratory: Negative for cough, chest tightness, shortness of breath, wheezing and stridor.         No chest congestion.  No hemoptysis.   Cardiovascular: Negative for chest pain, palpitations and leg swelling.        No orthopnea, STEVENS, or PND.  No claudication or syncope.   Gastrointestinal: Negative for abdominal pain, blood in stool, constipation, diarrhea, nausea, rectal pain and vomiting.        No melena.  No hematemesis.  No heartburn, dysphagia or odynophagia.  No early satiety, belching, or bloating.    Endocrine: Negative for  "cold intolerance, heat intolerance, polydipsia, polyphagia and polyuria.        No hair loss or dry skin.  No hot flashes.     Genitourinary: Negative for difficulty urinating, dyspareunia, dysuria, flank pain, frequency, hematuria, pelvic pain, urgency, vaginal bleeding and vaginal discharge.        No nocturia, incomplete emptying, or incontinence.   Musculoskeletal: Positive for arthralgias, back pain and neck pain. Negative for gait problem, joint swelling, myalgias and neck stiffness.        No joint stiffness.   Skin: Negative for rash.        No new skin lesions or changes in skin lesions. No breast pain or masses.  No nipple discharge or nipple inversion.   Neurological: Positive for numbness (and tingling in the hands and feet). Negative for dizziness, tremors, syncope, speech difficulty, weakness, light-headedness and headaches.        No memory loss.  No decreased concentration.   Hematological: Negative for adenopathy. Does not bruise/bleed easily.   Psychiatric/Behavioral: Negative for confusion, self-injury, sleep disturbance and suicidal ideas. The patient is not nervous/anxious.         No depression.           Objective     Vitals:    12/08/21 1500   BP: 124/77   BP Location: Right arm   Pulse: 76   Resp: 18   Temp: 97.7 °F (36.5 °C)   TempSrc: Temporal   Weight: 56.5 kg (124 lb 8 oz)   Height: 169.5 cm (66.75\")       Body mass index is 19.64 kg/m².    Physical Exam  Vitals and nursing note reviewed.   Constitutional:       Appearance: Normal appearance. She is well-developed and normal weight.   HENT:      Head: Normocephalic and atraumatic.      Right Ear: Tympanic membrane, ear canal and external ear normal.      Left Ear: Tympanic membrane, ear canal and external ear normal.      Mouth/Throat:      Mouth: Mucous membranes are moist. No oral lesions.      Pharynx: Oropharynx is clear.      Comments: Tonsils normal.  Eyes:      Extraocular Movements: Extraocular movements intact.      " Conjunctiva/sclera: Conjunctivae normal.      Pupils: Pupils are equal, round, and reactive to light.      Comments: Fundi normal bilaterally.   Neck:      Thyroid: No thyromegaly.      Vascular: No carotid bruit.   Cardiovascular:      Rate and Rhythm: Normal rate and regular rhythm.      Pulses: Normal pulses.      Heart sounds: Normal heart sounds. No murmur heard.  No friction rub. No gallop.    Pulmonary:      Effort: Pulmonary effort is normal.      Breath sounds: Normal breath sounds.   Chest:   Breasts:      Right: No inverted nipple, mass, nipple discharge, skin change, tenderness, axillary adenopathy or supraclavicular adenopathy.      Left: No inverted nipple, mass, nipple discharge, skin change, tenderness, axillary adenopathy or supraclavicular adenopathy.       Abdominal:      General: Bowel sounds are normal. There is no distension or abdominal bruit.      Palpations: Abdomen is soft. There is no hepatomegaly, splenomegaly or mass.      Tenderness: There is no abdominal tenderness.   Genitourinary:     Comments:  and rectal exam deferred.  Musculoskeletal:         General: Normal range of motion.      Cervical back: Normal range of motion and neck supple.      Right lower leg: No edema.      Left lower leg: No edema.   Lymphadenopathy:      Cervical: No cervical adenopathy.      Upper Body:      Right upper body: No supraclavicular or axillary adenopathy.      Left upper body: No supraclavicular or axillary adenopathy.   Skin:     Findings: No rash.      Comments: No atypical skin lesions.   Neurological:      Mental Status: She is alert.      Cranial Nerves: Cranial nerves are intact.      Motor: Motor function is intact. No abnormal muscle tone.      Coordination: Coordination is intact.      Gait: Gait is intact.      Deep Tendon Reflexes: Reflexes are normal and symmetric.   Psychiatric:         Mood and Affect: Mood normal.         PHQ-2 Depression Screening  Little interest or pleasure in  doing things? 0   Feeling down, depressed, or hopeless? 0   PHQ-2 Total Score 0         Counseling was given to patient for the following topics:  appropriate exercise, healthy eating habits, disease prevention, risk factors for cancer, importance of self breast exam and breast health, importance of immunizations, including risks and benefits, bone health, sun safety, seatbelt use and safe driving. Also discussed the importance of regular dental and vision care, as well recommendation for a yearly screening skin exam after age 40.  Written information provided to patient on these topics and other health maintenance issues.      Assessment/Plan       Diagnoses and all orders for this visit:    1. Encounter for health maintenance examination in adult (Primary)    2. Gastroesophageal reflux disease, unspecified whether esophagitis present   Continue current medication(s) as noted in the history of present illness.    3. Other osteoporosis without current pathological fracture   Continue Calcium and Vitamin D supplementation, as well as weight bearing exercises.    4. Vitamin D deficiency   Continue Vitamin D supplementation.    5. Need for hepatitis A immunization  -     Hepatitis A Vaccine Adult IM      Patient's Body mass index is 19.64 kg/m². indicating that she is within normal range (BMI 18.5-24.9). No BMI management plan needed..    I recommended Shingrix (new Shingles vaccine) at the pharmacy.    The patient states she will get her Influenza vaccine at work.    The patient agrees to schedule her Mammogram.        Return in about 1 year (around 12/8/2022) for Annual physical, fasting.

## 2021-12-08 NOTE — PATIENT INSTRUCTIONS
I recommend Shingrix (new Shingles vaccine) at the pharmacy.      Health Maintenance for Postmenopausal Women  Menopause is a normal process in which your ability to get pregnant comes to an end. This process happens slowly over many months or years, usually between the ages of 48 and 55. Menopause is complete when you have missed your menstrual periods for 12 months.  It is important to talk with your health care provider about some of the most common conditions that affect women after menopause (postmenopausal women). These include heart disease, cancer, and bone loss (osteoporosis). Adopting a healthy lifestyle and getting preventive care can help to promote your health and wellness. The actions you take can also lower your chances of developing some of these common conditions.  What should I know about menopause?  During menopause, you may get a number of symptoms, such as:  · Hot flashes. These can be moderate or severe.  · Night sweats.  · Decrease in sex drive.  · Mood swings.  · Headaches.  · Tiredness.  · Irritability.  · Memory problems.  · Insomnia.  Choosing to treat or not to treat these symptoms is a decision that you make with your health care provider.  Do I need hormone replacement therapy?  · Hormone replacement therapy is effective in treating symptoms that are caused by menopause, such as hot flashes and night sweats.  · Hormone replacement carries certain risks, especially as you become older. If you are thinking about using estrogen or estrogen with progestin, discuss the benefits and risks with your health care provider.  What is my risk for heart disease and stroke?  The risk of heart disease, heart attack, and stroke increases as you age. One of the causes may be a change in the body's hormones during menopause. This can affect how your body uses dietary fats, triglycerides, and cholesterol. Heart attack and stroke are medical emergencies. There are many things that you can do to help  prevent heart disease and stroke.  Watch your blood pressure  · High blood pressure causes heart disease and increases the risk of stroke. This is more likely to develop in people who have high blood pressure readings, are of  descent, or are overweight.  · Have your blood pressure checked:  ? Every 3-5 years if you are 18-39 years of age.  ? Every year if you are 40 years old or older.  Eat a healthy diet    · Eat a diet that includes plenty of vegetables, fruits, low-fat dairy products, and lean protein.  · Do not eat a lot of foods that are high in solid fats, added sugars, or sodium.    Get regular exercise  Get regular exercise. This is one of the most important things you can do for your health. Most adults should:  · Try to exercise for at least 150 minutes each week. The exercise should increase your heart rate and make you sweat (moderate-intensity exercise).  · Try to do strengthening exercises at least twice each week. Do these in addition to the moderate-intensity exercise.  · Spend less time sitting. Even light physical activity can be beneficial.  Other tips  · Work with your health care provider to achieve or maintain a healthy weight.  · Do not use any products that contain nicotine or tobacco, such as cigarettes, e-cigarettes, and chewing tobacco. If you need help quitting, ask your health care provider.  · Know your numbers. Ask your health care provider to check your cholesterol and your blood sugar (glucose). Continue to have your blood tested as directed by your health care provider.  Do I need screening for cancer?  Depending on your health history and family history, you may need to have cancer screening at different stages of your life. This may include screening for:  · Breast cancer.  · Cervical cancer.  · Lung cancer.  · Colorectal cancer.  What is my risk for osteoporosis?  After menopause, you may be at increased risk for osteoporosis. Osteoporosis is a condition in which bone  destruction happens more quickly than new bone creation. To help prevent osteoporosis or the bone fractures that can happen because of osteoporosis, you may take the following actions:  · If you are 19-50 years old, get at least 1,000 mg of calcium and at least 600 mg of vitamin D per day.  · If you are older than age 50 but younger than age 70, get at least 1,200 mg of calcium and at least 600 mg of vitamin D per day.  · If you are older than age 70, get at least 1,200 mg of calcium and at least 800 mg of vitamin D per day.  Smoking and drinking excessive alcohol increase the risk of osteoporosis. Eat foods that are rich in calcium and vitamin D, and do weight-bearing exercises several times each week as directed by your health care provider.  How does menopause affect my mental health?  Depression may occur at any age, but it is more common as you become older. Common symptoms of depression include:  · Low or sad mood.  · Changes in sleep patterns.  · Changes in appetite or eating patterns.  · Feeling an overall lack of motivation or enjoyment of activities that you previously enjoyed.  · Frequent crying spells.  Talk with your health care provider if you think that you are experiencing depression.  General instructions  See your health care provider for regular wellness exams and vaccines. This may include:  · Scheduling regular health, dental, and eye exams.  · Getting and maintaining your vaccines. These include:  ? Influenza vaccine. Get this vaccine each year before the flu season begins.  ? Pneumonia vaccine.  ? Shingles vaccine.  ? Tetanus, diphtheria, and pertussis (Tdap) booster vaccine.  Your health care provider may also recommend other immunizations.  Tell your health care provider if you have ever been abused or do not feel safe at home.  Summary  · Menopause is a normal process in which your ability to get pregnant comes to an end.  · This condition causes hot flashes, night sweats, decreased  interest in sex, mood swings, headaches, or lack of sleep.  · Treatment for this condition may include hormone replacement therapy.  · Take actions to keep yourself healthy, including exercising regularly, eating a healthy diet, watching your weight, and checking your blood pressure and blood sugar levels.  · Get screened for cancer and depression. Make sure that you are up to date with all your vaccines.  This information is not intended to replace advice given to you by your health care provider. Make sure you discuss any questions you have with your health care provider.  Document Revised: 12/11/2019 Document Reviewed: 12/11/2019  Elsevier Patient Education © 2021 [employer]

## 2021-12-09 LAB
25(OH)D3 SERPL-MCNC: 55.9 NG/ML (ref 30–100)
ALBUMIN SERPL-MCNC: 4.4 G/DL (ref 3.5–5.2)
ALBUMIN/GLOB SERPL: 1.3 G/DL
ALP SERPL-CCNC: 53 U/L (ref 39–117)
ALT SERPL W P-5'-P-CCNC: 17 U/L (ref 1–33)
ANION GAP SERPL CALCULATED.3IONS-SCNC: 11.3 MMOL/L (ref 5–15)
AST SERPL-CCNC: 24 U/L (ref 1–32)
BASOPHILS # BLD AUTO: 0.06 10*3/MM3 (ref 0–0.2)
BASOPHILS NFR BLD AUTO: 1.2 % (ref 0–1.5)
BILIRUB SERPL-MCNC: 1.2 MG/DL (ref 0–1.2)
BUN SERPL-MCNC: 11 MG/DL (ref 6–20)
BUN/CREAT SERPL: 15.9 (ref 7–25)
CALCIUM SPEC-SCNC: 9.8 MG/DL (ref 8.6–10.5)
CHLORIDE SERPL-SCNC: 99 MMOL/L (ref 98–107)
CHOLEST SERPL-MCNC: 234 MG/DL (ref 0–200)
CO2 SERPL-SCNC: 28.7 MMOL/L (ref 22–29)
CREAT SERPL-MCNC: 0.69 MG/DL (ref 0.57–1)
DEPRECATED RDW RBC AUTO: 45 FL (ref 37–54)
EOSINOPHIL # BLD AUTO: 0.1 10*3/MM3 (ref 0–0.4)
EOSINOPHIL NFR BLD AUTO: 2 % (ref 0.3–6.2)
ERYTHROCYTE [DISTWIDTH] IN BLOOD BY AUTOMATED COUNT: 12.7 % (ref 12.3–15.4)
GFR SERPL CREATININE-BSD FRML MDRD: 88 ML/MIN/1.73
GLOBULIN UR ELPH-MCNC: 3.3 GM/DL
GLUCOSE SERPL-MCNC: 95 MG/DL (ref 65–99)
HCT VFR BLD AUTO: 43.9 % (ref 34–46.6)
HDLC SERPL-MCNC: 106 MG/DL (ref 40–60)
HGB BLD-MCNC: 14.2 G/DL (ref 12–15.9)
LDLC SERPL CALC-MCNC: 118 MG/DL (ref 0–100)
LDLC/HDLC SERPL: 1.1 {RATIO}
LYMPHOCYTES # BLD AUTO: 2.19 10*3/MM3 (ref 0.7–3.1)
LYMPHOCYTES NFR BLD AUTO: 44 % (ref 19.6–45.3)
MCH RBC QN AUTO: 31.3 PG (ref 26.6–33)
MCHC RBC AUTO-ENTMCNC: 32.3 G/DL (ref 31.5–35.7)
MCV RBC AUTO: 96.7 FL (ref 79–97)
MONOCYTES # BLD AUTO: 0.41 10*3/MM3 (ref 0.1–0.9)
MONOCYTES NFR BLD AUTO: 8.2 % (ref 5–12)
NEUTROPHILS NFR BLD AUTO: 2.22 10*3/MM3 (ref 1.7–7)
NEUTROPHILS NFR BLD AUTO: 44.6 % (ref 42.7–76)
PLATELET # BLD AUTO: 140 10*3/MM3 (ref 140–450)
PMV BLD AUTO: 12.5 FL (ref 6–12)
POTASSIUM SERPL-SCNC: 4 MMOL/L (ref 3.5–5.2)
PROT SERPL-MCNC: 7.7 G/DL (ref 6–8.5)
RBC # BLD AUTO: 4.54 10*6/MM3 (ref 3.77–5.28)
SODIUM SERPL-SCNC: 139 MMOL/L (ref 136–145)
TRIGL SERPL-MCNC: 59 MG/DL (ref 0–150)
TSH SERPL DL<=0.05 MIU/L-ACNC: 1.61 UIU/ML (ref 0.27–4.2)
URATE SERPL-MCNC: 3.5 MG/DL (ref 2.4–5.7)
VLDLC SERPL-MCNC: 10 MG/DL (ref 5–40)
WBC NRBC COR # BLD: 4.98 10*3/MM3 (ref 3.4–10.8)

## 2021-12-16 PROBLEM — H54.7 VISUAL PROBLEMS: Status: ACTIVE | Noted: 2021-12-16

## 2022-01-24 ENCOUNTER — HOSPITAL ENCOUNTER (OUTPATIENT)
Dept: GENERAL RADIOLOGY | Facility: HOSPITAL | Age: 57
Discharge: HOME | End: 2022-01-24

## 2022-01-24 ENCOUNTER — OFFICE VISIT (OUTPATIENT)
Dept: INTERNAL MEDICINE | Facility: CLINIC | Age: 57
End: 2022-01-24

## 2022-01-24 ENCOUNTER — LAB (OUTPATIENT)
Dept: LAB | Facility: HOSPITAL | Age: 57
End: 2022-01-24

## 2022-01-24 VITALS
BODY MASS INDEX: 19.76 KG/M2 | WEIGHT: 125.25 LBS | RESPIRATION RATE: 20 BRPM | TEMPERATURE: 97.6 F | DIASTOLIC BLOOD PRESSURE: 64 MMHG | SYSTOLIC BLOOD PRESSURE: 124 MMHG

## 2022-01-24 DIAGNOSIS — M79.89 SWELLING OF TOE OF BOTH FEET: ICD-10-CM

## 2022-01-24 DIAGNOSIS — M79.674 PAIN IN TOES OF BOTH FEET: ICD-10-CM

## 2022-01-24 DIAGNOSIS — M79.674 PAIN IN TOES OF BOTH FEET: Primary | ICD-10-CM

## 2022-01-24 DIAGNOSIS — M79.675 PAIN IN TOES OF BOTH FEET: Primary | ICD-10-CM

## 2022-01-24 DIAGNOSIS — M79.675 PAIN IN TOES OF BOTH FEET: ICD-10-CM

## 2022-01-24 DIAGNOSIS — R20.2 PARESTHESIA: ICD-10-CM

## 2022-01-24 LAB
ALBUMIN SERPL-MCNC: 4.6 G/DL (ref 3.5–5.2)
ALBUMIN/GLOB SERPL: 1.6 G/DL
ALP SERPL-CCNC: 57 U/L (ref 39–117)
ALT SERPL W P-5'-P-CCNC: 14 U/L (ref 1–33)
ANION GAP SERPL CALCULATED.3IONS-SCNC: 9.4 MMOL/L (ref 5–15)
ASO AB SERPL-ACNC: NEGATIVE [IU]/ML
AST SERPL-CCNC: 21 U/L (ref 1–32)
BASOPHILS # BLD MANUAL: 0.04 10*3/MM3 (ref 0–0.2)
BASOPHILS NFR BLD MANUAL: 1 % (ref 0–1.5)
BILIRUB SERPL-MCNC: 1.2 MG/DL (ref 0–1.2)
BUN SERPL-MCNC: 15 MG/DL (ref 6–20)
BUN/CREAT SERPL: 20.8 (ref 7–25)
CALCIUM SPEC-SCNC: 9.8 MG/DL (ref 8.6–10.5)
CHLORIDE SERPL-SCNC: 101 MMOL/L (ref 98–107)
CHROMATIN AB SERPL-ACNC: <10 IU/ML (ref 0–14)
CO2 SERPL-SCNC: 29.6 MMOL/L (ref 22–29)
CREAT SERPL-MCNC: 0.72 MG/DL (ref 0.57–1)
CRP SERPL-MCNC: <0.3 MG/DL (ref 0–0.5)
DEPRECATED RDW RBC AUTO: 40.6 FL (ref 37–54)
EOSINOPHIL # BLD MANUAL: 0.11 10*3/MM3 (ref 0–0.4)
EOSINOPHIL NFR BLD MANUAL: 3 % (ref 0.3–6.2)
ERYTHROCYTE [DISTWIDTH] IN BLOOD BY AUTOMATED COUNT: 12.4 % (ref 12.3–15.4)
FOLATE SERPL-MCNC: 14.6 NG/ML (ref 4.78–24.2)
GFR SERPL CREATININE-BSD FRML MDRD: 83 ML/MIN/1.73
GLOBULIN UR ELPH-MCNC: 2.8 GM/DL
GLUCOSE SERPL-MCNC: 75 MG/DL (ref 65–99)
HCT VFR BLD AUTO: 40.5 % (ref 34–46.6)
HGB BLD-MCNC: 13.6 G/DL (ref 12–15.9)
LYMPHOCYTES # BLD MANUAL: 1.47 10*3/MM3 (ref 0.7–3.1)
LYMPHOCYTES NFR BLD MANUAL: 9 % (ref 5–12)
MCH RBC QN AUTO: 30.4 PG (ref 26.6–33)
MCHC RBC AUTO-ENTMCNC: 33.6 G/DL (ref 31.5–35.7)
MCV RBC AUTO: 90.6 FL (ref 79–97)
MONOCYTES # BLD: 0.34 10*3/MM3 (ref 0.1–0.9)
NEUTROPHILS # BLD AUTO: 1.81 10*3/MM3 (ref 1.7–7)
NEUTROPHILS NFR BLD MANUAL: 48 % (ref 42.7–76)
PLAT MORPH BLD: NORMAL
PLATELET # BLD AUTO: 126 10*3/MM3 (ref 140–450)
PMV BLD AUTO: 13 FL (ref 6–12)
POTASSIUM SERPL-SCNC: 4.4 MMOL/L (ref 3.5–5.2)
PROT SERPL-MCNC: 7.4 G/DL (ref 6–8.5)
RBC # BLD AUTO: 4.47 10*6/MM3 (ref 3.77–5.28)
RBC MORPH BLD: NORMAL
SODIUM SERPL-SCNC: 140 MMOL/L (ref 136–145)
URATE SERPL-MCNC: 4.1 MG/DL (ref 2.4–5.7)
VARIANT LYMPHS NFR BLD MANUAL: 39 % (ref 19.6–45.3)
VIT B12 BLD-MCNC: 588 PG/ML (ref 211–946)
WBC MORPH BLD: NORMAL
WBC NRBC COR # BLD: 3.78 10*3/MM3 (ref 3.4–10.8)

## 2022-01-24 PROCEDURE — 86747 PARVOVIRUS ANTIBODY: CPT

## 2022-01-24 PROCEDURE — 84550 ASSAY OF BLOOD/URIC ACID: CPT

## 2022-01-24 PROCEDURE — 82607 VITAMIN B-12: CPT

## 2022-01-24 PROCEDURE — 85007 BL SMEAR W/DIFF WBC COUNT: CPT

## 2022-01-24 PROCEDURE — 85025 COMPLETE CBC W/AUTO DIFF WBC: CPT

## 2022-01-24 PROCEDURE — 86140 C-REACTIVE PROTEIN: CPT

## 2022-01-24 PROCEDURE — 99214 OFFICE O/P EST MOD 30 MIN: CPT | Performed by: INTERNAL MEDICINE

## 2022-01-24 PROCEDURE — 86063 ANTISTREPTOLYSIN O SCREEN: CPT

## 2022-01-24 PROCEDURE — 80053 COMPREHEN METABOLIC PANEL: CPT

## 2022-01-24 PROCEDURE — 73630 X-RAY EXAM OF FOOT: CPT

## 2022-01-24 PROCEDURE — 86431 RHEUMATOID FACTOR QUANT: CPT

## 2022-01-24 PROCEDURE — 86038 ANTINUCLEAR ANTIBODIES: CPT

## 2022-01-24 PROCEDURE — 82746 ASSAY OF FOLIC ACID SERUM: CPT

## 2022-01-24 RX ORDER — PREGABALIN 50 MG/1
50 CAPSULE ORAL 3 TIMES DAILY
COMMUNITY
End: 2022-02-01

## 2022-01-24 NOTE — PROGRESS NOTES
Subjective       Sharon Turner is a 56 y.o. female.     Chief Complaint   Patient presents with   • Toe Pain     redness and welling        History obtained from the patient.      Toe Pain   Incident onset: 6 weeks ago off and on. The incident occurred at home. There was no injury mechanism. The pain is present in the left toes and right toes. Quality: sore. The pain is moderate. The pain has been fluctuating (better in the am) since onset. Associated symptoms include tingling (inhands and feet, not new). Pertinent negatives include no inability to bear weight (but painful), loss of motion, loss of sensation, muscle weakness or numbness. She reports no foreign bodies present. The symptoms are aggravated by weight bearing and palpation. She has tried nothing for the symptoms.     The patient states she had a negative Covid test on 1/17/2022 (PCR) and on 1/21/2022 (rapid).  The patient does have a history of Raynaud's (hands).  She reports dry eyes and intermittent sores in her nose.  She is wondering about an autoimmune disorder.    The following portions of the patient's history were reviewed and updated as appropriate: allergies, current medications, past family history, past medical history, past social history, past surgical history and problem list.      Review of Systems   Constitutional: Negative for chills and fever.   Respiratory: Negative for shortness of breath.    Cardiovascular: Negative for chest pain.   Musculoskeletal: Positive for arthralgias (chronic), back pain (chronic) and joint swelling. Negative for myalgias, neck pain and neck stiffness.   Skin: Positive for rash (abdomen, in Winter time, not new).   Neurological: Positive for tingling (inhands and feet, not new). Negative for numbness.   Hematological: Negative for adenopathy.           Objective     Blood pressure 124/64, temperature 97.6 °F (36.4 °C), temperature source Temporal, resp. rate 20, weight 56.8 kg (125 lb 4 oz).    Physical  Exam  Vitals and nursing note reviewed.   Constitutional:       Appearance: She is normal weight.   Cardiovascular:      Rate and Rhythm: Normal rate and regular rhythm.      Pulses:           Dorsalis pedis pulses are 2+ on the right side and 2+ on the left side.      Heart sounds: Normal heart sounds. No murmur heard.      Pulmonary:      Effort: Pulmonary effort is normal.      Breath sounds: Normal breath sounds.   Musculoskeletal:      Comments: There is distal erythema and edema of the right second, the left second, and the left third toes.  They are slightly tender to palpation.  There is no pain with flexion and extension of the ankles, nor with inversion and eversion of the feet.   Neurological:      Mental Status: She is alert.           Assessment/Plan   Diagnoses and all orders for this visit:    1. Pain in toes of both feet (Primary)  -     CBC & Differential; Future  -     CHASIDY; Future  -     Antistreptolysin O Screen; Future  -     C-reactive Protein; Future  -     Rheumatoid Factor; Future  -     Uric Acid; Future  -     Comprehensive Metabolic Panel; Future  -     Parvovirus B19 Antibody, IgG & IgM; Future  -     XR Foot 3+ View Bilateral; Future  -     methylPREDNISolone (MEDROL) 4 MG dose pack; Take as directed on package instructions.  Dispense: 1 each; Refill: 0   The patient states she cannot take NSAIDs.    2. Swelling of toe of both feet  -     CBC & Differential; Future  -     CHASIDY; Future  -     Antistreptolysin O Screen; Future  -     C-reactive Protein; Future  -     Rheumatoid Factor; Future  -     Uric Acid; Future  -     Comprehensive Metabolic Panel; Future  -     Parvovirus B19 Antibody, IgG & IgM; Future  -     XR Foot 3+ View Bilateral; Future  -     methylPREDNISolone (MEDROL) 4 MG dose pack; Take as directed on package instructions.  Dispense: 1 each; Refill: 0   The patient states she cannot take NSAIDs.    3. Paresthesia  -     Vitamin B12; Future  -     Folate;  Future            Return if symptoms worsen or fail to improve.

## 2022-01-25 LAB — ANA SER QL: NEGATIVE

## 2022-01-25 RX ORDER — METHYLPREDNISOLONE 4 MG/1
TABLET ORAL
Qty: 1 EACH | Refills: 0 | Status: SHIPPED | OUTPATIENT
Start: 2022-01-25 | End: 2022-02-01

## 2022-01-26 LAB
B19V IGG SER IA-ACNC: 5.7 INDEX (ref 0–0.8)
B19V IGM SER IA-ACNC: 0.1 INDEX (ref 0–0.8)

## 2022-02-01 ENCOUNTER — LAB (OUTPATIENT)
Dept: LAB | Facility: HOSPITAL | Age: 57
End: 2022-02-01

## 2022-02-01 ENCOUNTER — OFFICE VISIT (OUTPATIENT)
Dept: INTERNAL MEDICINE | Facility: CLINIC | Age: 57
End: 2022-02-01

## 2022-02-01 VITALS
SYSTOLIC BLOOD PRESSURE: 130 MMHG | RESPIRATION RATE: 20 BRPM | TEMPERATURE: 98.6 F | BODY MASS INDEX: 19.76 KG/M2 | DIASTOLIC BLOOD PRESSURE: 72 MMHG | HEART RATE: 80 BPM | WEIGHT: 125.25 LBS

## 2022-02-01 DIAGNOSIS — R10.2 PELVIC PAIN: Primary | ICD-10-CM

## 2022-02-01 DIAGNOSIS — R10.9 FLANK PAIN: ICD-10-CM

## 2022-02-01 DIAGNOSIS — Z87.442 HISTORY OF NEPHROLITHIASIS: ICD-10-CM

## 2022-02-01 LAB
BILIRUB BLD-MCNC: NEGATIVE MG/DL
CLARITY, POC: CLEAR
COLOR UR: YELLOW
EXPIRATION DATE: NORMAL
GLUCOSE UR STRIP-MCNC: NEGATIVE MG/DL
KETONES UR QL: NEGATIVE
LEUKOCYTE EST, POC: NEGATIVE
Lab: NORMAL
NITRITE UR-MCNC: NEGATIVE MG/ML
PH UR: 5 [PH] (ref 5–8)
PROT UR STRIP-MCNC: NEGATIVE MG/DL
RBC # UR STRIP: NEGATIVE /UL
SP GR UR: 1.03 (ref 1–1.03)
UROBILINOGEN UR QL: NORMAL

## 2022-02-01 PROCEDURE — 81003 URINALYSIS AUTO W/O SCOPE: CPT | Performed by: INTERNAL MEDICINE

## 2022-02-01 PROCEDURE — 99214 OFFICE O/P EST MOD 30 MIN: CPT | Performed by: INTERNAL MEDICINE

## 2022-02-01 PROCEDURE — 87086 URINE CULTURE/COLONY COUNT: CPT | Performed by: INTERNAL MEDICINE

## 2022-02-01 NOTE — PROGRESS NOTES
Subjective       Sharon Turner is a 56 y.o. female.     Chief Complaint   Patient presents with   • Pelvic Pain     10 days    • Abdominal Pain   • Flank Pain     Rt        History obtained from the patient.      Pelvic Pain  The patient's primary symptoms include pelvic pain. The patient's pertinent negatives include no vaginal discharge. This is a new problem. Episode onset: 1.5- 2 weeks ago. The problem occurs intermittently. The problem has been gradually worsening. The pain is moderate. Affected Side: middle, pressure. She is not pregnant. Associated symptoms include abdominal pain (epigastric  ) and flank pain (right, started 2 days ago). Pertinent negatives include no chills, constipation, diarrhea, discolored urine, dysuria, fever, frequency, headaches, hematuria, nausea, rash, urgency or vomiting. The symptoms are aggravated by eating (makes epigastric pain worse). She has tried nothing (But started Prilosec a few days ago which has helped the epigastric pain) for the symptoms. She is sexually active. Her past medical history is significant for an abdominal surgery (h/o appendectomy). (Has intermittent GERD and a h/o Nephrolithiasis.)      Dates that she had a normal pelvic ultrasound, done by Dr. Lowe (GYN) in the summer 2021.  She states she had a cervical polyp which was removed.  Last Pap was normal 11/9/2020.  The following portions of the patient's history were reviewed and updated as appropriate: allergies, current medications, past family history, past medical history, past social history, past surgical history and problem list.      Review of Systems   Constitutional: Negative for chills and fever.   Respiratory: Negative for shortness of breath.    Cardiovascular: Negative for chest pain.   Gastrointestinal: Positive for abdominal pain (epigastric  ). Negative for constipation, diarrhea, nausea and vomiting.   Genitourinary: Positive for flank pain (right, started 2 days ago) and pelvic pain.  Negative for dyspareunia, dysuria, frequency, hematuria, urgency, vaginal bleeding and vaginal discharge.   Musculoskeletal: Positive for arthralgias (chronic). Negative for neck stiffness.   Skin: Negative for rash.   Neurological: Negative for headaches.   Hematological: Negative for adenopathy.           Objective     Blood pressure 130/72, pulse 80, temperature 98.6 °F (37 °C), temperature source Temporal, resp. rate 20, weight 56.8 kg (125 lb 4 oz).    Physical Exam  Vitals and nursing note reviewed.   Constitutional:       Appearance: She is well-developed and normal weight.   Cardiovascular:      Rate and Rhythm: Normal rate and regular rhythm.      Heart sounds: Normal heart sounds. No murmur heard.      Pulmonary:      Effort: Pulmonary effort is normal.      Breath sounds: Normal breath sounds.   Abdominal:      General: Bowel sounds are normal. There is no distension.      Palpations: Abdomen is soft. There is no hepatomegaly, splenomegaly or mass.      Tenderness: There is no abdominal tenderness. There is no right CVA tenderness or left CVA tenderness.   Skin:     Findings: No rash.   Neurological:      Mental Status: She is alert.   Psychiatric:         Mood and Affect: Mood normal.         Results for orders placed or performed in visit on 02/01/22   POC Urinalysis Dipstick, Automated    Specimen: Urine   Result Value Ref Range    Color Yellow Yellow, Straw, Dark Yellow, Shannan    Clarity, UA Clear Clear    Specific Gravity  1.030 1.005 - 1.030    pH, Urine 5.0 5.0 - 8.0    Leukocytes Negative Negative    Nitrite, UA Negative Negative    Protein, POC Negative Negative mg/dL    Glucose, UA Negative Negative, 1000 mg/dL (3+) mg/dL    Ketones, UA Negative Negative    Urobilinogen, UA Normal Normal    Bilirubin Negative Negative    Blood, UA Negative Negative    Lot Number [id number]     Expiration Date 12/31/2022        Assessment/Plan   Diagnoses and all orders for this visit:    1. Pelvic pain  (Primary)  -     Urine Culture - Urine, Urine, Clean Catch  -     CT Abdomen Pelvis Stone Protocol; Future   Recommended increased fluids.    2. Flank pain  -     POC Urinalysis Dipstick, Automated  -     CT Abdomen Pelvis Stone Protocol; Future    3. History of nephrolithiasis  -     CT Abdomen Pelvis Stone Protocol; Future        Return if symptoms worsen or fail to improve.

## 2022-02-02 ENCOUNTER — HOSPITAL ENCOUNTER (OUTPATIENT)
Dept: CT IMAGING | Facility: HOSPITAL | Age: 57
Discharge: HOME | End: 2022-02-02
Admitting: INTERNAL MEDICINE

## 2022-02-02 DIAGNOSIS — Z87.442 HISTORY OF NEPHROLITHIASIS: ICD-10-CM

## 2022-02-02 DIAGNOSIS — R10.9 FLANK PAIN: ICD-10-CM

## 2022-02-02 DIAGNOSIS — R10.2 PELVIC PAIN: ICD-10-CM

## 2022-02-02 LAB — BACTERIA SPEC AEROBE CULT: NO GROWTH

## 2022-02-02 PROCEDURE — 74176 CT ABD & PELVIS W/O CONTRAST: CPT

## 2022-02-04 ENCOUNTER — APPOINTMENT (OUTPATIENT)
Dept: CT IMAGING | Facility: HOSPITAL | Age: 57
End: 2022-02-04

## 2022-05-16 ENCOUNTER — OFFICE VISIT (OUTPATIENT)
Dept: INTERNAL MEDICINE | Facility: CLINIC | Age: 57
End: 2022-05-16

## 2022-05-16 ENCOUNTER — LAB (OUTPATIENT)
Dept: LAB | Facility: HOSPITAL | Age: 57
End: 2022-05-16

## 2022-05-16 VITALS
HEART RATE: 70 BPM | TEMPERATURE: 97.7 F | OXYGEN SATURATION: 99 % | WEIGHT: 127 LBS | BODY MASS INDEX: 20.04 KG/M2 | SYSTOLIC BLOOD PRESSURE: 124 MMHG | DIASTOLIC BLOOD PRESSURE: 64 MMHG

## 2022-05-16 DIAGNOSIS — R00.2 PALPITATIONS: Primary | ICD-10-CM

## 2022-05-16 DIAGNOSIS — K21.9 GASTROESOPHAGEAL REFLUX DISEASE, UNSPECIFIED WHETHER ESOPHAGITIS PRESENT: ICD-10-CM

## 2022-05-16 DIAGNOSIS — R07.9 CHEST PAIN, UNSPECIFIED TYPE: ICD-10-CM

## 2022-05-16 DIAGNOSIS — R10.13 EPIGASTRIC PAIN: ICD-10-CM

## 2022-05-16 LAB
ALBUMIN SERPL-MCNC: 4.5 G/DL (ref 3.5–5.2)
ALBUMIN/GLOB SERPL: 2 G/DL
ALP SERPL-CCNC: 56 U/L (ref 39–117)
ALT SERPL W P-5'-P-CCNC: 11 U/L (ref 1–33)
ANION GAP SERPL CALCULATED.3IONS-SCNC: 11.6 MMOL/L (ref 5–15)
AST SERPL-CCNC: 18 U/L (ref 1–32)
BASOPHILS # BLD AUTO: 0.07 10*3/MM3 (ref 0–0.2)
BASOPHILS NFR BLD AUTO: 1.8 % (ref 0–1.5)
BILIRUB SERPL-MCNC: 0.6 MG/DL (ref 0–1.2)
BUN SERPL-MCNC: 16 MG/DL (ref 6–20)
BUN/CREAT SERPL: 23.9 (ref 7–25)
CALCIUM SPEC-SCNC: 9.3 MG/DL (ref 8.6–10.5)
CHLORIDE SERPL-SCNC: 103 MMOL/L (ref 98–107)
CO2 SERPL-SCNC: 25.4 MMOL/L (ref 22–29)
CREAT SERPL-MCNC: 0.67 MG/DL (ref 0.57–1)
DEPRECATED RDW RBC AUTO: 44.9 FL (ref 37–54)
EGFRCR SERPLBLD CKD-EPI 2021: 102.1 ML/MIN/1.73
EOSINOPHIL # BLD AUTO: 0.12 10*3/MM3 (ref 0–0.4)
EOSINOPHIL NFR BLD AUTO: 3.1 % (ref 0.3–6.2)
ERYTHROCYTE [DISTWIDTH] IN BLOOD BY AUTOMATED COUNT: 12.9 % (ref 12.3–15.4)
GLOBULIN UR ELPH-MCNC: 2.3 GM/DL
GLUCOSE SERPL-MCNC: 51 MG/DL (ref 65–99)
HCT VFR BLD AUTO: 40.8 % (ref 34–46.6)
HGB BLD-MCNC: 13.2 G/DL (ref 12–15.9)
IMM GRANULOCYTES # BLD AUTO: 0.04 10*3/MM3 (ref 0–0.05)
IMM GRANULOCYTES NFR BLD AUTO: 1 % (ref 0–0.5)
LIPASE SERPL-CCNC: 35 U/L (ref 13–60)
LYMPHOCYTES # BLD AUTO: 1.35 10*3/MM3 (ref 0.7–3.1)
LYMPHOCYTES NFR BLD AUTO: 34.6 % (ref 19.6–45.3)
MCH RBC QN AUTO: 30.5 PG (ref 26.6–33)
MCHC RBC AUTO-ENTMCNC: 32.4 G/DL (ref 31.5–35.7)
MCV RBC AUTO: 94.2 FL (ref 79–97)
MONOCYTES # BLD AUTO: 0.49 10*3/MM3 (ref 0.1–0.9)
MONOCYTES NFR BLD AUTO: 12.6 % (ref 5–12)
NEUTROPHILS NFR BLD AUTO: 1.83 10*3/MM3 (ref 1.7–7)
NEUTROPHILS NFR BLD AUTO: 46.9 % (ref 42.7–76)
NRBC BLD AUTO-RTO: 0 /100 WBC (ref 0–0.2)
PLATELET # BLD AUTO: 133 10*3/MM3 (ref 140–450)
PMV BLD AUTO: 13 FL (ref 6–12)
POTASSIUM SERPL-SCNC: 3.9 MMOL/L (ref 3.5–5.2)
PROT SERPL-MCNC: 6.8 G/DL (ref 6–8.5)
RBC # BLD AUTO: 4.33 10*6/MM3 (ref 3.77–5.28)
SODIUM SERPL-SCNC: 140 MMOL/L (ref 136–145)
T4 FREE SERPL-MCNC: 1.1 NG/DL (ref 0.93–1.7)
TSH SERPL DL<=0.05 MIU/L-ACNC: 2.13 UIU/ML (ref 0.27–4.2)
WBC NRBC COR # BLD: 3.9 10*3/MM3 (ref 3.4–10.8)

## 2022-05-16 PROCEDURE — 84439 ASSAY OF FREE THYROXINE: CPT | Performed by: INTERNAL MEDICINE

## 2022-05-16 PROCEDURE — 80053 COMPREHEN METABOLIC PANEL: CPT | Performed by: INTERNAL MEDICINE

## 2022-05-16 PROCEDURE — 84443 ASSAY THYROID STIM HORMONE: CPT | Performed by: INTERNAL MEDICINE

## 2022-05-16 PROCEDURE — 93000 ELECTROCARDIOGRAM COMPLETE: CPT | Performed by: INTERNAL MEDICINE

## 2022-05-16 PROCEDURE — 83690 ASSAY OF LIPASE: CPT | Performed by: INTERNAL MEDICINE

## 2022-05-16 PROCEDURE — 99214 OFFICE O/P EST MOD 30 MIN: CPT | Performed by: INTERNAL MEDICINE

## 2022-05-16 PROCEDURE — 85025 COMPLETE CBC W/AUTO DIFF WBC: CPT | Performed by: INTERNAL MEDICINE

## 2022-05-16 RX ORDER — MEDROXYPROGESTERONE ACETATE 10 MG/1
TABLET ORAL
COMMUNITY
End: 2022-12-29

## 2022-05-16 RX ORDER — OMEPRAZOLE 20 MG/1
20 CAPSULE, DELAYED RELEASE ORAL DAILY PRN
Qty: 90 CAPSULE | Refills: 3 | Status: SHIPPED | OUTPATIENT
Start: 2022-05-16

## 2022-05-16 RX ORDER — ESTRADIOL 10 UG/1
INSERT VAGINAL
COMMUNITY
Start: 2022-05-05

## 2022-05-16 NOTE — PROGRESS NOTES
Subjective       Sharon Turner is a 56 y.o. female.     Chief Complaint   Patient presents with   • Palpitations       History obtained from the patient.      Palpitations   This is a chronic problem. The problem occurs intermittently. Progression since onset: worse x 1 week. Episode Length: 20-30 episodes per day. Nothing (No recent stress) aggravates the symptoms. Associated symptoms include chest pain (feels it is stomach related) and an irregular heartbeat. Pertinent negatives include no anxiety, chest fullness, coughing, diaphoresis, dizziness, fever, malaise/fatigue, nausea, near-syncope, numbness (and no tingling), shortness of breath, syncope, vomiting or weakness. She has tried nothing for the symptoms. Risk factors include family history and post menopause. There is no history of anemia, anxiety or heart disease.      Last labs on 1/24/2022 were normal.  Last TSH on 12/8/2021 was normal.    Of note, the patient has a history of GERD/Gastritis, which has worsened.  She has been having intermittent abdominal pain in epigastric area for 3 to 4 months.  She feels this pain is different than her previous pain from the gastritis.  She states she feels bloated.  2 weeks ago, she started Prilosec without relief.  She is also stopped her Vagifem 5 to 6 days ago, and that has not yet made a difference.          The following portions of the patient's history were reviewed and updated as appropriate: allergies, current medications, past family history, past medical history, past social history, past surgical history and problem list.      Review of Systems   Constitutional: Negative for chills, diaphoresis, fever and malaise/fatigue.   HENT: Negative for congestion, rhinorrhea, trouble swallowing and voice change.    Respiratory: Negative for cough, shortness of breath and wheezing.    Cardiovascular: Positive for chest pain (feels it is stomach related) and palpitations. Negative for syncope and near-syncope.    Gastrointestinal: Positive for abdominal distention (bloated) and abdominal pain (epigastric). Negative for blood in stool, constipation, diarrhea, nausea and vomiting.   Endocrine: Negative for cold intolerance and heat intolerance.        No hair loss, but has dry skin     Musculoskeletal: Negative for arthralgias and myalgias.   Neurological: Negative for dizziness, weakness and numbness (and no tingling).   Psychiatric/Behavioral: Negative for sleep disturbance. The patient is not nervous/anxious.         Denies depression             Objective     Blood pressure 124/64, pulse 70, temperature 97.7 °F (36.5 °C), temperature source Temporal, weight 57.6 kg (127 lb), SpO2 99 %.    Physical Exam  Vitals and nursing note reviewed.   Constitutional:       Appearance: She is well-developed and normal weight.   Neck:      Thyroid: No thyroid mass or thyromegaly.      Vascular: No carotid bruit.   Cardiovascular:      Rate and Rhythm: Normal rate and regular rhythm.      Pulses: Normal pulses.      Heart sounds: Normal heart sounds. No murmur heard.    No friction rub. No gallop.   Pulmonary:      Effort: Pulmonary effort is normal.      Breath sounds: Normal breath sounds.   Abdominal:      General: Bowel sounds are normal. There is no distension or abdominal bruit.      Palpations: Abdomen is soft. There is no hepatomegaly, splenomegaly or mass.      Tenderness: There is no abdominal tenderness.   Musculoskeletal:      Cervical back: Normal range of motion and neck supple.      Right lower leg: No edema.      Left lower leg: No edema.   Neurological:      Mental Status: She is alert.   Psychiatric:         Mood and Affect: Mood normal.           ECG 12 Lead    Date/Time: 5/16/2022 9:29 AM  Performed by: Mirta Mack MD  Authorized by: Mirta Mack MD   Comparison: not compared with previous ECG   Previous ECG: no previous ECG available  Rhythm: sinus rhythm  Ectopy comments: None  Rate: normal  Conduction:  conduction normal  ST Segments: ST segments normal  T Waves: T waves normal  QRS axis: normal  Other findings: low voltage    Clinical impression: normal ECG              Assessment & Plan   Diagnoses and all orders for this visit:    1. Palpitations (Primary)  -     ECG 12 Lead  -     Comprehensive Metabolic Panel  -     CBC & Differential  -     TSH  -     T4, Free    2. Chest pain, unspecified type  -     ECG 12 Lead    3. Epigastric pain  -     Lipase  -     CBC & Differential  -     Cancel: Manual Differential    4. Gastroesophageal reflux disease, unspecified whether esophagitis present  -     omeprazole (priLOSEC) 20 MG capsule; Take 1 capsule by mouth Daily As Needed (abdominal pain and heartburn).  Dispense: 90 capsule; Refill: 3- NEW            Return if symptoms worsen or fail to improve.

## 2022-06-06 DIAGNOSIS — R00.2 PALPITATIONS: Primary | ICD-10-CM

## 2022-06-16 ENCOUNTER — APPOINTMENT (OUTPATIENT)
Dept: ULTRASOUND IMAGING | Facility: HOSPITAL | Age: 57
End: 2022-06-16

## 2022-07-14 ENCOUNTER — APPOINTMENT (OUTPATIENT)
Dept: ULTRASOUND IMAGING | Facility: HOSPITAL | Age: 57
End: 2022-07-14

## 2022-08-19 DIAGNOSIS — M81.8 OTHER OSTEOPOROSIS WITHOUT CURRENT PATHOLOGICAL FRACTURE: Primary | ICD-10-CM

## 2022-09-15 ENCOUNTER — TRANSCRIBE ORDERS (OUTPATIENT)
Dept: LAB | Facility: HOSPITAL | Age: 57
End: 2022-09-15

## 2022-09-15 ENCOUNTER — LAB (OUTPATIENT)
Dept: LAB | Facility: HOSPITAL | Age: 57
End: 2022-09-15

## 2022-09-15 DIAGNOSIS — G54.1 LUMBOSACRAL PLEXUS LESION: Primary | ICD-10-CM

## 2022-09-15 DIAGNOSIS — A60.00 GENITAL HERPES SIMPLEX, UNSPECIFIED SITE: ICD-10-CM

## 2022-09-15 DIAGNOSIS — B00.9 DISEASE DUE TO ALPHAHERPESVIRINAE: ICD-10-CM

## 2022-09-15 LAB
ALBUMIN SERPL-MCNC: 4.6 G/DL (ref 3.5–5.2)
ALBUMIN/GLOB SERPL: 1.5 G/DL
ALP SERPL-CCNC: 69 U/L (ref 39–117)
ALT SERPL W P-5'-P-CCNC: 10 U/L (ref 1–33)
ANION GAP SERPL CALCULATED.3IONS-SCNC: 11 MMOL/L (ref 5–15)
AST SERPL-CCNC: 17 U/L (ref 1–32)
BILIRUB SERPL-MCNC: 1 MG/DL (ref 0–1.2)
BUN SERPL-MCNC: 20 MG/DL (ref 6–20)
BUN/CREAT SERPL: 31.3 (ref 7–25)
CALCIUM SPEC-SCNC: 10.1 MG/DL (ref 8.6–10.5)
CHLORIDE SERPL-SCNC: 99 MMOL/L (ref 98–107)
CO2 SERPL-SCNC: 30 MMOL/L (ref 22–29)
CREAT SERPL-MCNC: 0.64 MG/DL (ref 0.57–1)
EGFRCR SERPLBLD CKD-EPI 2021: 102.6 ML/MIN/1.73
GLOBULIN UR ELPH-MCNC: 3 GM/DL
GLUCOSE SERPL-MCNC: 89 MG/DL (ref 65–99)
POTASSIUM SERPL-SCNC: 4 MMOL/L (ref 3.5–5.2)
PROT SERPL-MCNC: 7.6 G/DL (ref 6–8.5)
SODIUM SERPL-SCNC: 140 MMOL/L (ref 136–145)

## 2022-09-15 PROCEDURE — 36415 COLL VENOUS BLD VENIPUNCTURE: CPT | Performed by: INTERNAL MEDICINE

## 2022-09-15 PROCEDURE — 80053 COMPREHEN METABOLIC PANEL: CPT | Performed by: INTERNAL MEDICINE

## 2022-09-23 ENCOUNTER — TELEPHONE (OUTPATIENT)
Dept: INTERNAL MEDICINE | Facility: CLINIC | Age: 57
End: 2022-09-23

## 2022-09-23 NOTE — TELEPHONE ENCOUNTER
Stubbed toe last night and thinks it was broken. Rates pain 7/10. Has been treating with OTC NSAIDS, with some relief.     Would like to have xray done

## 2022-09-23 NOTE — TELEPHONE ENCOUNTER
I would need to see her to order an x-ray.  However, I would recommend giving it a few days with the NSAIDs and ice before being seen.

## 2022-12-09 ENCOUNTER — TELEPHONE (OUTPATIENT)
Dept: INTERNAL MEDICINE | Facility: CLINIC | Age: 57
End: 2022-12-09

## 2022-12-09 NOTE — TELEPHONE ENCOUNTER
Caller: Sharon Turner    Relationship to patient: Self    Best call back number: [phone]    Date of positive COVID19 test: 12/09/2022    Date if possible COVID19 exposure: EITHER 12/06 OR 12/07    COVID19 symptoms: COUGH, LOW GRADE TEMP, BODY ACHES     Date of initial quarantine: 12/09    Additional information or concerns: PATIENT WANTS TO KNOW IF THERE IS ANYTHING THAT THE PROVIDER RECCOMENDS    What is the patients preferred pharmacy: [facility]

## 2022-12-12 NOTE — TELEPHONE ENCOUNTER
Spoke with pt and she stated that she is feeling much better. 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

## 2022-12-27 ENCOUNTER — TELEPHONE (OUTPATIENT)
Dept: INTERNAL MEDICINE | Facility: CLINIC | Age: 57
End: 2022-12-27

## 2022-12-27 DIAGNOSIS — E55.9 VITAMIN D DEFICIENCY: Primary | ICD-10-CM

## 2022-12-27 DIAGNOSIS — Z00.00 ENCOUNTER FOR HEALTH MAINTENANCE EXAMINATION IN ADULT: ICD-10-CM

## 2022-12-27 DIAGNOSIS — Z13.6 SCREENING FOR CARDIOVASCULAR CONDITION: ICD-10-CM

## 2022-12-27 NOTE — TELEPHONE ENCOUNTER
PATIENT HAS CALLED REQUESTING LAB ORDERS TO BE PLACED ASAP IN PREPARATION FOR HER UPCOMING ANNUAL VISIT ON 12/29/22. PATIENT IS ALSO WANTING HER VITAMIN D CHECK IN THAT ORDER. PATIENT IS REQUESTING A CALL BACK ASAP ONCE ORDERS HAVE BEEN PLACED.    CALL BACK NUMBER [phone]

## 2022-12-27 NOTE — TELEPHONE ENCOUNTER
Spoke with Sharon and she states she does not have any new symptoms and would like the usual labs ordered  She would like to come to the lab the day before her appointment .  Advised her to call the office the day she wants to come to make sure we have someone working in the lab that day   Verbal understanding given

## 2022-12-28 NOTE — TELEPHONE ENCOUNTER
Left detailed message that Dr Mack ordered fasting labs and to call back if she has further questions

## 2022-12-29 ENCOUNTER — OFFICE VISIT (OUTPATIENT)
Dept: INTERNAL MEDICINE | Facility: CLINIC | Age: 57
End: 2022-12-29
Payer: COMMERCIAL

## 2022-12-29 ENCOUNTER — LAB (OUTPATIENT)
Dept: LAB | Facility: HOSPITAL | Age: 57
End: 2022-12-29
Payer: COMMERCIAL

## 2022-12-29 VITALS
HEART RATE: 64 BPM | WEIGHT: 125 LBS | HEIGHT: 67 IN | TEMPERATURE: 97.8 F | SYSTOLIC BLOOD PRESSURE: 110 MMHG | BODY MASS INDEX: 19.62 KG/M2 | OXYGEN SATURATION: 97 % | DIASTOLIC BLOOD PRESSURE: 66 MMHG

## 2022-12-29 DIAGNOSIS — Z13.6 SCREENING FOR CARDIOVASCULAR CONDITION: ICD-10-CM

## 2022-12-29 DIAGNOSIS — K21.9 GASTROESOPHAGEAL REFLUX DISEASE, UNSPECIFIED WHETHER ESOPHAGITIS PRESENT: ICD-10-CM

## 2022-12-29 DIAGNOSIS — E55.9 VITAMIN D DEFICIENCY: ICD-10-CM

## 2022-12-29 DIAGNOSIS — M81.8 OTHER OSTEOPOROSIS WITHOUT CURRENT PATHOLOGICAL FRACTURE: ICD-10-CM

## 2022-12-29 DIAGNOSIS — Z00.00 ENCOUNTER FOR HEALTH MAINTENANCE EXAMINATION IN ADULT: ICD-10-CM

## 2022-12-29 DIAGNOSIS — Z00.00 ENCOUNTER FOR HEALTH MAINTENANCE EXAMINATION IN ADULT: Primary | ICD-10-CM

## 2022-12-29 DIAGNOSIS — G89.29 OTHER CHRONIC PAIN: ICD-10-CM

## 2022-12-29 LAB
25(OH)D3 SERPL-MCNC: 52.4 NG/ML (ref 30–100)
ALBUMIN SERPL-MCNC: 4.3 G/DL (ref 3.5–5.2)
ALBUMIN/GLOB SERPL: 1.5 G/DL
ALP SERPL-CCNC: 62 U/L (ref 39–117)
ALT SERPL W P-5'-P-CCNC: 10 U/L (ref 1–33)
ANION GAP SERPL CALCULATED.3IONS-SCNC: 9 MMOL/L (ref 5–15)
AST SERPL-CCNC: 21 U/L (ref 1–32)
BASOPHILS # BLD AUTO: 0.05 10*3/MM3 (ref 0–0.2)
BASOPHILS NFR BLD AUTO: 1 % (ref 0–1.5)
BILIRUB SERPL-MCNC: 0.9 MG/DL (ref 0–1.2)
BUN SERPL-MCNC: 19 MG/DL (ref 6–20)
BUN/CREAT SERPL: 23.5 (ref 7–25)
CALCIUM SPEC-SCNC: 9.4 MG/DL (ref 8.6–10.5)
CHLORIDE SERPL-SCNC: 102 MMOL/L (ref 98–107)
CHOLEST SERPL-MCNC: 204 MG/DL (ref 0–200)
CO2 SERPL-SCNC: 29 MMOL/L (ref 22–29)
CREAT SERPL-MCNC: 0.81 MG/DL (ref 0.57–1)
DEPRECATED RDW RBC AUTO: 44 FL (ref 37–54)
EGFRCR SERPLBLD CKD-EPI 2021: 84.3 ML/MIN/1.73
EOSINOPHIL # BLD AUTO: 0.06 10*3/MM3 (ref 0–0.4)
EOSINOPHIL NFR BLD AUTO: 1.2 % (ref 0.3–6.2)
ERYTHROCYTE [DISTWIDTH] IN BLOOD BY AUTOMATED COUNT: 13 % (ref 12.3–15.4)
GLOBULIN UR ELPH-MCNC: 2.8 GM/DL
GLUCOSE SERPL-MCNC: 79 MG/DL (ref 65–99)
HCT VFR BLD AUTO: 43.3 % (ref 34–46.6)
HDLC SERPL-MCNC: 83 MG/DL (ref 40–60)
HGB BLD-MCNC: 13.5 G/DL (ref 12–15.9)
IMM GRANULOCYTES # BLD AUTO: 0.01 10*3/MM3 (ref 0–0.05)
IMM GRANULOCYTES NFR BLD AUTO: 0.2 % (ref 0–0.5)
LDLC SERPL CALC-MCNC: 114 MG/DL (ref 0–100)
LDLC/HDLC SERPL: 1.37 {RATIO}
LYMPHOCYTES # BLD AUTO: 2.06 10*3/MM3 (ref 0.7–3.1)
LYMPHOCYTES NFR BLD AUTO: 40.8 % (ref 19.6–45.3)
MCH RBC QN AUTO: 29.1 PG (ref 26.6–33)
MCHC RBC AUTO-ENTMCNC: 31.2 G/DL (ref 31.5–35.7)
MCV RBC AUTO: 93.3 FL (ref 79–97)
MONOCYTES # BLD AUTO: 0.43 10*3/MM3 (ref 0.1–0.9)
MONOCYTES NFR BLD AUTO: 8.5 % (ref 5–12)
NEUTROPHILS NFR BLD AUTO: 2.44 10*3/MM3 (ref 1.7–7)
NEUTROPHILS NFR BLD AUTO: 48.3 % (ref 42.7–76)
NRBC BLD AUTO-RTO: 0 /100 WBC (ref 0–0.2)
PLATELET # BLD AUTO: 197 10*3/MM3 (ref 140–450)
PMV BLD AUTO: 12.5 FL (ref 6–12)
POTASSIUM SERPL-SCNC: 4.3 MMOL/L (ref 3.5–5.2)
PROT SERPL-MCNC: 7.1 G/DL (ref 6–8.5)
RBC # BLD AUTO: 4.64 10*6/MM3 (ref 3.77–5.28)
SODIUM SERPL-SCNC: 140 MMOL/L (ref 136–145)
TRIGL SERPL-MCNC: 37 MG/DL (ref 0–150)
TSH SERPL DL<=0.05 MIU/L-ACNC: 2.37 UIU/ML (ref 0.27–4.2)
VLDLC SERPL-MCNC: 7 MG/DL (ref 5–40)
WBC NRBC COR # BLD: 5.05 10*3/MM3 (ref 3.4–10.8)

## 2022-12-29 PROCEDURE — 80061 LIPID PANEL: CPT

## 2022-12-29 PROCEDURE — 82306 VITAMIN D 25 HYDROXY: CPT

## 2022-12-29 PROCEDURE — 80053 COMPREHEN METABOLIC PANEL: CPT

## 2022-12-29 PROCEDURE — 85025 COMPLETE CBC W/AUTO DIFF WBC: CPT

## 2022-12-29 PROCEDURE — 84443 ASSAY THYROID STIM HORMONE: CPT

## 2022-12-29 PROCEDURE — 99396 PREV VISIT EST AGE 40-64: CPT | Performed by: INTERNAL MEDICINE

## 2022-12-29 PROCEDURE — 1159F MED LIST DOCD IN RCRD: CPT | Performed by: INTERNAL MEDICINE

## 2022-12-29 PROCEDURE — 1160F RVW MEDS BY RX/DR IN RCRD: CPT | Performed by: INTERNAL MEDICINE

## 2022-12-29 NOTE — PATIENT INSTRUCTIONS
I recommend Shingrix (new Shingles vaccine) at the pharmacy.    I recommend the COVID-19 Bivalent booster as soon as you have completely recover ed from your recent COVID-19 infection, as we discussed.    Please schedule an appointment with a Dermatologist to evaluate the skin lesions on your forehead, as we discussed.    Please schedule your annual screening Mammogram, as we discussed.    Recommend [facility].   Information was given today.    Health Maintenance for Postmenopausal Women  Menopause is a normal process in which your ability to get pregnant comes to an end. This process happens slowly over many months or years, usually between the ages of 48 and 55. Menopause is complete when you have missed your menstrual period for 12 months.  It is important to talk with your health care provider about some of the most common conditions that affect women after menopause (postmenopausal women). These include heart disease, cancer, and bone loss (osteoporosis). Adopting a healthy lifestyle and getting preventive care can help to promote your health and wellness. The actions you take can also lower your chances of developing some of these common conditions.  What are the signs and symptoms of menopause?  During menopause, you may have the following symptoms:  Hot flashes. These can be moderate or severe.  Night sweats.  Decrease in sex drive.  Mood swings.  Headaches.  Tiredness (fatigue).  Irritability.  Memory problems.  Problems falling asleep or staying asleep.  Talk with your health care provider about treatment options for your symptoms.  Do I need hormone replacement therapy?  Hormone replacement therapy is effective in treating symptoms that are caused by menopause, such as hot flashes and night sweats.  Hormone replacement carries certain risks, especially as you become older. If you are thinking about using estrogen or estrogen with progestin, discuss the benefits and risks with your  health care provider.  How can I reduce my risk for heart disease and stroke?  The risk of heart disease, heart attack, and stroke increases as you age. One of the causes may be a change in the body's hormones during menopause. This can affect how your body uses dietary fats, triglycerides, and cholesterol. Heart attack and stroke are medical emergencies. There are many things that you can do to help prevent heart disease and stroke.  Watch your blood pressure  High blood pressure causes heart disease and increases the risk of stroke. This is more likely to develop in people who have high blood pressure readings or are overweight.  Have your blood pressure checked:  Every 3-5 years if you are 18-39 years of age.  Every year if you are 40 years old or older.  Eat a healthy diet    Eat a diet that includes plenty of vegetables, fruits, low-fat dairy products, and lean protein.  Do not eat a lot of foods that are high in solid fats, added sugars, or sodium.  Get regular exercise  Get regular exercise. This is one of the most important things you can do for your health. Most adults should:  Try to exercise for at least 150 minutes each week. The exercise should increase your heart rate and make you sweat (moderate-intensity exercise).  Try to do strengthening exercises at least twice each week. Do these in addition to the moderate-intensity exercise.  Spend less time sitting. Even light physical activity can be beneficial.  Other tips  Work with your health care provider to achieve or maintain a healthy weight.  Do not use any products that contain nicotine or tobacco. These products include cigarettes, chewing tobacco, and vaping devices, such as e-cigarettes. If you need help quitting, ask your health care provider.  Know your numbers. Ask your health care provider to check your cholesterol and your blood sugar (glucose). Continue to have your blood tested as directed by your health care provider.  Do I need screening  for cancer?  Depending on your health history and family history, you may need to have cancer screenings at different stages of your life. This may include screening for:  Breast cancer.  Cervical cancer.  Lung cancer.  Colorectal cancer.  What is my risk for osteoporosis?  After menopause, you may be at increased risk for osteoporosis. Osteoporosis is a condition in which bone destruction happens more quickly than new bone creation. To help prevent osteoporosis or the bone fractures that can happen because of osteoporosis, you may take the following actions:  If you are 19-50 years old, get at least 1,000 mg of calcium and at least 600 international units (IU) of vitamin D per day.  If you are older than age 50 but younger than age 70, get at least 1,200 mg of calcium and at least 600 international units (IU) of vitamin D per day.  If you are older than age 70, get at least 1,200 mg of calcium and at least 800 international units (IU) of vitamin D per day.  Smoking and drinking excessive alcohol increase the risk of osteoporosis. Eat foods that are rich in calcium and vitamin D, and do weight-bearing exercises several times each week as directed by your health care provider.  How does menopause affect my mental health?  Depression may occur at any age, but it is more common as you become older. Common symptoms of depression include:  Feeling depressed.  Changes in sleep patterns.  Changes in appetite or eating patterns.  Feeling an overall lack of motivation or enjoyment of activities that you previously enjoyed.  Frequent crying spells.  Talk with your health care provider if you think that you are experiencing any of these symptoms.  General instructions  See your health care provider for regular wellness exams and vaccines. This may include:  Scheduling regular health, dental, and eye exams.  Getting and maintaining your vaccines. These include:  Influenza vaccine. Get this vaccine each year before the flu  season begins.  Pneumonia vaccine.  Shingles vaccine.  Tetanus, diphtheria, and pertussis (Tdap) booster vaccine.  Your health care provider may also recommend other immunizations.  Tell your health care provider if you have ever been abused or do not feel safe at home.  Summary  Menopause is a normal process in which your ability to get pregnant comes to an end.  This condition causes hot flashes, night sweats, decreased interest in sex, mood swings, headaches, or lack of sleep.  Treatment for this condition may include hormone replacement therapy.  Take actions to keep yourself healthy, including exercising regularly, eating a healthy diet, watching your weight, and checking your blood pressure and blood sugar levels.  Get screened for cancer and depression. Make sure that you are up to date with all your vaccines.  This information is not intended to replace advice given to you by your health care provider. Make sure you discuss any questions you have with your health care provider.  Document Revised: 05/09/2022 Document Reviewed: 05/09/2022  Elsevier Patient Education © 2022 [employer]

## 2022-12-29 NOTE — PROGRESS NOTES
Subjective     Chief Complaint:  Physical Exam.    History of Present Illness    History obtained from the patient.    The patient has Chronic Sacral/Coccygeal/Perineal Pain, onset 2018, etiology undetermined despite extensive work-up.   She states she started taking Collagen 6 months ago and her pain is much improved.    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     GERD Follow-Up: The patient is here for follow-up of Gastroesophageal Reflux Disease, which has been stable.   Interval Events: She states her GERD flares with antibiotics or other medications.    Symptoms: Denies abdominal pain, heartburn, acid reflux, nausea, vomiting, hematemesis, dysphagia, odynophagia, hematochezia, melena, early satiety, belching, and bloating.   Associated Symptoms: Denies chronic sore throat, cough, hoarseness, wheezing.   Medication: Prilosec as needed.      Osteoporosis Follow-Up: The patient is here for follow-up of Osteoporosis, which has been stable.   Interval Events:  DEXA scan done in June 2020 showed Osteoporosis of both the L-spine and hip per patient report.  She did not feel like she could tolerate oral medication, and Reclast was ordered by her Endocrinologist, but it was not covered by her insurance.  Prolia was discussed, but the patient would need to take oral medication first. She states she had another DEXA scan on 8/12/2021 and her Osteoporosis had improved to Osteopenia. On 12/8/2021, Vitamin D level was 55.9.  Symptoms: Has chronic back pain as above.  Denies arthralgias, myalgias, neck pain, hip pain, wrist/hand pain, paresthesias, loss of balance, and gait instability.   Medication: She is currently taking a Multivitamin and additional Vitamin D 3, but has been off Calcium due to GI upset.  She is planning on starting it again at a lower dose.    Sharon Turner is a 57 y.o. female who presents for an Annual Physical.  Fasting labs were done earlier today.    PMH,  PSH, SocHx, FamHx, Allergies, and Medications: Reviewed and updated.    Outpatient Medications Prior to Visit   Medication Sig Dispense Refill   • Calcium-Phosphorus-Vitamin D (CITRACAL +D3 PO) Take 600 mg by mouth Daily.     • Ergocalciferol (VITAMIN D2 PO) Take 50,000 Units by mouth Every 30 (Thirty) Days.     • estradiol (VAGIFEM) 10 MCG tablet vaginal tablet      • famciclovir (FAMVIR) 500 MG tablet 1 tablet Daily.     • omeprazole (priLOSEC) 20 MG capsule Take 1 capsule by mouth Daily As Needed (abdominal pain and heartburn). 90 capsule 3   • medroxyPROGESTERone (PROVERA) 10 MG tablet Provera 10 mg tablet   Take 1 tablet every day by oral route.     • Multiple Vitamins-Minerals (OCUVITE-LUTEIN PO) Take 1 tablet by mouth Daily.       No facility-administered medications prior to visit.       Immunization History   Administered Date(s) Administered   • COVID-19 (MODERNA) 1st, 2nd, 3rd Dose Only 12/23/2020, 01/22/2021, 11/15/2021   • Flu Vaccine Quad PF >36MO 10/30/2020   • Hepatitis A 08/13/2020, 12/08/2021   • Influenza, Unspecified 11/01/2020, 10/01/2021, 12/01/2022   • Tdap 05/14/2008, 10/07/2019         Patient Active Problem List   Diagnosis   • Raynaud's disease   • GERD (gastroesophageal reflux disease)   • Vitamin D deficiency   • History of nephrolithiasis   • Herpes simplex type 2 infection   • Osteoporosis   • Visual problems   • Chronic pain       Health Habits:  Dental Exam. up to date  Eye Exam. up to date  Hearing Loss:  No  Exercise: 7 times/week.  Current exercise activities include: walking  Diet: Healthy  Multivitamin: Yes    Safe Driving:  Yes  Seat Belt:  Yes  Bike Helmet:  N/A  Skin Screening:  Yes  Sunscreen: Yes  SBE / DEVONTE: Yes  Sexual Activity:  Not currently  Birth Control:  Menopause  STD Prevention:  N/A    Last Pap: 1/19/2020, normal.  Last Mammogram: 11/21/2021, category 1.    Last DEXA Scan: 8/12/2021, Osteopenia per patient report.  Last Colonoscopy: 5/10/2019, sigmoid  diverticulosis, otherwise negative.  Last PSA: N/A    Social:    Social History     Socioeconomic History   • Marital status:    • Number of children: 1   Tobacco Use   • Smoking status: Never   • Smokeless tobacco: Never   Vaping Use   • Vaping Use: Never used   Substance and Sexual Activity   • Alcohol use: Yes     Comment: 1-2 drinks (beer or cocktail) per week   • Drug use: Never   • Sexual activity: Not Currently     Partners: Male     Birth control/protection: Post-menopausal         Current Medical Providers:    Mirta Mack MD (Internal Medicine / Pediatrics)    [facility] providers who are involved in the care of this patient are listed above.         Review of Systems   Constitutional: Negative for appetite change, chills, fatigue, fever and unexpected weight change.        No night sweats.    HENT: Positive for congestion (mild since Covid infection 12/9/22). Negative for ear discharge, ear pain, hearing loss, nosebleeds, postnasal drip, rhinorrhea, sinus pressure, sinus pain, sneezing, sore throat, tinnitus and voice change.         Denies snoring.   Eyes: Negative for photophobia, pain, discharge, redness, itching and visual disturbance.   Respiratory: Negative for cough, chest tightness, shortness of breath and wheezing.         No chest congestion.  No hemoptysis.   Cardiovascular: Negative for chest pain, palpitations and leg swelling.        No orthopnea, STEVENS, or PND.  No claudication or syncope.   Gastrointestinal: Negative for abdominal pain, blood in stool, constipation, diarrhea, nausea, rectal pain and vomiting.        No melena.  No hematemesis.  No heartburn, dysphagia or odynophagia.  No early satiety, belching, or bloating.    Endocrine: Negative for cold intolerance, heat intolerance, polydipsia, polyphagia and polyuria.        No hair loss or dry skin.  No hot flashes.     Genitourinary: Negative for difficulty urinating, dysuria, flank pain, frequency, hematuria, pelvic  pain, urgency, vaginal bleeding and vaginal discharge.        No nocturia, incomplete emptying, or incontinence.   Musculoskeletal: Positive for back pain. Negative for arthralgias, gait problem, joint swelling, myalgias, neck pain and neck stiffness.        No joint stiffness.   Skin: Negative for rash.        Has new skin lesions on forehead x 2-3 months, but no  changes in existing skin lesions. No breast pain or masses.  No nipple discharge or nipple inversion.   Neurological: Negative for dizziness, tremors, syncope, speech difficulty, weakness, light-headedness, numbness and headaches.        No tingling.  No memory loss.  No decreased concentration.   Hematological: Negative for adenopathy. Does not bruise/bleed easily.   Psychiatric/Behavioral: Negative for confusion, self-injury, sleep disturbance and suicidal ideas. The patient is not nervous/anxious.         No depression.           Objective     Vitals:    12/29/22 1216   BP: 110/66   Pulse: 64   Temp: 97.8 °F (36.6 °C)   SpO2: 97%   Weight: 56.7 kg (125 lb)   Height: 170.2 cm (67\")   PainSc: 0-No pain       Body mass index is 19.58 kg/m².    Physical Exam  Vitals and nursing note reviewed.   Constitutional:       Appearance: Normal appearance. She is well-developed and normal weight.   HENT:      Head: Normocephalic and atraumatic.      Right Ear: Tympanic membrane, ear canal and external ear normal.      Left Ear: Tympanic membrane, ear canal and external ear normal.      Mouth/Throat:      Mouth: Mucous membranes are moist. No oral lesions.      Pharynx: Oropharynx is clear.      Comments: Tonsils normal.  Eyes:      Extraocular Movements: Extraocular movements intact.      Conjunctiva/sclera: Conjunctivae normal.      Pupils: Pupils are equal, round, and reactive to light.      Comments: Fundi normal bilaterally.   Neck:      Thyroid: No thyromegaly.      Vascular: No carotid bruit.   Cardiovascular:      Rate and Rhythm: Normal rate and regular  rhythm.      Pulses: Normal pulses.      Heart sounds: Normal heart sounds. No murmur heard.    No friction rub. No gallop.   Pulmonary:      Effort: Pulmonary effort is normal.      Breath sounds: Normal breath sounds.   Chest:   Breasts:     Right: No inverted nipple, mass, nipple discharge, skin change or tenderness.      Left: No inverted nipple, mass, nipple discharge, skin change or tenderness.   Abdominal:      General: Bowel sounds are normal. There is no distension or abdominal bruit.      Palpations: Abdomen is soft. There is no hepatomegaly, splenomegaly or mass.      Tenderness: There is no abdominal tenderness.   Genitourinary:     Comments:  and rectal exam deferred.  Musculoskeletal:         General: Normal range of motion.      Cervical back: Normal range of motion and neck supple.      Right lower leg: No edema.      Left lower leg: No edema.   Lymphadenopathy:      Cervical: No cervical adenopathy.      Upper Body:      Right upper body: No supraclavicular or axillary adenopathy.      Left upper body: No supraclavicular or axillary adenopathy.   Skin:     Findings: No rash.      Comments: Scattered scaly flesh-colored lesions on forehead.  No additional atypical skin lesions.   Neurological:      Mental Status: She is alert.      Motor: Motor function is intact. No abnormal muscle tone.      Coordination: Coordination is intact.      Gait: Gait is intact.      Deep Tendon Reflexes: Reflexes are normal and symmetric.   Psychiatric:         Mood and Affect: Mood normal.         PHQ-2 Depression Screening  Little interest or pleasure in doing things? 0-->not at all   Feeling down, depressed, or hopeless? 0-->not at all   PHQ-2 Total Score 0         Counseling was given to patient for the following topics: appropriate exercise, healthy eating habits, disease prevention, risk factors for cancer, importance of self breast exam and breast health, importance of immunizations, including risks and  benefits, bone health, sun safety, seatbelt use and safe driving. Also discussed the importance of regular dental and vision care, as well recommendation for a yearly screening skin exam after age 40.  Written information provided to patient on these topics and other health maintenance issues.        Diagnoses and all orders for this visit:    1. Encounter for health maintenance examination in adult (Primary)    2. Gastroesophageal reflux disease, unspecified whether esophagitis present   Continue current medication(s) as noted in the history of present illness.    3. Other osteoporosis without current pathological fracture   Continue Calcium and Vitamin D supplementation, as well as weight bearing exercises.    4. Vitamin D deficiency   Continue Vitamin D supplementation.    5. Other chronic pain   Continue Collagen supplement.    I recommended Shingrix (new Shingles vaccine) at the pharmacy.    I recommended the COVID-19 Bivalent booster as soon as she has completely recovered from her recent COVID-19 infection.    The patient agrees to schedule an appointment with a Dermatologist to evaluate the skin lesions on her forehead.    The patient agrees to schedule her annual screening Mammogram.    Recommended [facility].   Information was given today.        BMI is within normal parameters. No other follow-up for BMI required.            Return in about 1 year (around 12/29/2023) for Annual physical, fasting.

## 2023-09-10 DIAGNOSIS — K21.9 GASTROESOPHAGEAL REFLUX DISEASE, UNSPECIFIED WHETHER ESOPHAGITIS PRESENT: ICD-10-CM

## 2023-09-11 RX ORDER — OMEPRAZOLE 20 MG/1
CAPSULE, DELAYED RELEASE ORAL
Qty: 90 CAPSULE | Refills: 0 | Status: SHIPPED | OUTPATIENT
Start: 2023-09-11

## 2023-09-12 ENCOUNTER — LAB (OUTPATIENT)
Dept: LAB | Facility: HOSPITAL | Age: 58
End: 2023-09-12
Payer: COMMERCIAL

## 2023-09-12 ENCOUNTER — TRANSCRIBE ORDERS (OUTPATIENT)
Dept: LAB | Facility: HOSPITAL | Age: 58
End: 2023-09-12
Payer: COMMERCIAL

## 2023-09-12 DIAGNOSIS — Z62.820 OTHER PARENT-CHILD PROBLEMS: ICD-10-CM

## 2023-09-12 DIAGNOSIS — B00.9 DISEASE DUE TO ALPHAHERPESVIRINAE: ICD-10-CM

## 2023-09-12 DIAGNOSIS — G54.1 LUMBOSACRAL PLEXUS LESION: ICD-10-CM

## 2023-09-12 DIAGNOSIS — Z71.89 OTHER PARENT-CHILD PROBLEMS: ICD-10-CM

## 2023-09-12 DIAGNOSIS — M54.6 PAIN IN THORACIC SPINE: ICD-10-CM

## 2023-09-12 DIAGNOSIS — G54.1 LUMBOSACRAL PLEXUS LESION: Primary | ICD-10-CM

## 2023-09-12 LAB
ALBUMIN SERPL-MCNC: 4.3 G/DL (ref 3.5–5.2)
ALBUMIN/GLOB SERPL: 1.4 G/DL
ALP SERPL-CCNC: 63 U/L (ref 39–117)
ALT SERPL W P-5'-P-CCNC: 14 U/L (ref 1–33)
ANION GAP SERPL CALCULATED.3IONS-SCNC: 9 MMOL/L (ref 5–15)
AST SERPL-CCNC: 19 U/L (ref 1–32)
BILIRUB SERPL-MCNC: 0.9 MG/DL (ref 0–1.2)
BUN SERPL-MCNC: 14 MG/DL (ref 6–20)
BUN/CREAT SERPL: 20 (ref 7–25)
CALCIUM SPEC-SCNC: 9.3 MG/DL (ref 8.6–10.5)
CHLORIDE SERPL-SCNC: 100 MMOL/L (ref 98–107)
CO2 SERPL-SCNC: 28 MMOL/L (ref 22–29)
CREAT SERPL-MCNC: 0.7 MG/DL (ref 0.57–1)
DEPRECATED RDW RBC AUTO: 46.7 FL (ref 37–54)
EGFRCR SERPLBLD CKD-EPI 2021: 100.4 ML/MIN/1.73
ERYTHROCYTE [DISTWIDTH] IN BLOOD BY AUTOMATED COUNT: 13.4 % (ref 12.3–15.4)
GLOBULIN UR ELPH-MCNC: 3 GM/DL
GLUCOSE SERPL-MCNC: 90 MG/DL (ref 65–99)
HCT VFR BLD AUTO: 42.3 % (ref 34–46.6)
HGB BLD-MCNC: 13.3 G/DL (ref 12–15.9)
MCH RBC QN AUTO: 29.6 PG (ref 26.6–33)
MCHC RBC AUTO-ENTMCNC: 31.4 G/DL (ref 31.5–35.7)
MCV RBC AUTO: 94.2 FL (ref 79–97)
PLATELET # BLD AUTO: 114 10*3/MM3 (ref 140–450)
POTASSIUM SERPL-SCNC: 4.2 MMOL/L (ref 3.5–5.2)
PROT SERPL-MCNC: 7.3 G/DL (ref 6–8.5)
RBC # BLD AUTO: 4.49 10*6/MM3 (ref 3.77–5.28)
SODIUM SERPL-SCNC: 137 MMOL/L (ref 136–145)
WBC NRBC COR # BLD: 6.09 10*3/MM3 (ref 3.4–10.8)

## 2023-09-12 PROCEDURE — 36415 COLL VENOUS BLD VENIPUNCTURE: CPT | Performed by: INTERNAL MEDICINE

## 2023-09-12 PROCEDURE — 85027 COMPLETE CBC AUTOMATED: CPT

## 2023-09-12 PROCEDURE — 80053 COMPREHEN METABOLIC PANEL: CPT | Performed by: INTERNAL MEDICINE

## 2023-11-01 ENCOUNTER — TRANSCRIBE ORDERS (OUTPATIENT)
Dept: ADMINISTRATIVE | Facility: HOSPITAL | Age: 58
End: 2023-11-01
Payer: COMMERCIAL

## 2023-11-01 DIAGNOSIS — Z12.31 VISIT FOR SCREENING MAMMOGRAM: Primary | ICD-10-CM

## 2023-12-15 DIAGNOSIS — K21.9 GASTROESOPHAGEAL REFLUX DISEASE, UNSPECIFIED WHETHER ESOPHAGITIS PRESENT: ICD-10-CM

## 2023-12-15 RX ORDER — OMEPRAZOLE 20 MG/1
CAPSULE, DELAYED RELEASE ORAL
Qty: 90 CAPSULE | Refills: 3 | Status: SHIPPED | OUTPATIENT
Start: 2023-12-15

## 2024-01-08 ENCOUNTER — OFFICE VISIT (OUTPATIENT)
Dept: INTERNAL MEDICINE | Facility: CLINIC | Age: 59
End: 2024-01-08
Payer: COMMERCIAL

## 2024-01-08 VITALS
SYSTOLIC BLOOD PRESSURE: 110 MMHG | TEMPERATURE: 97.9 F | BODY MASS INDEX: 19.93 KG/M2 | HEIGHT: 67 IN | DIASTOLIC BLOOD PRESSURE: 70 MMHG | HEART RATE: 68 BPM | RESPIRATION RATE: 16 BRPM | WEIGHT: 127 LBS

## 2024-01-08 DIAGNOSIS — E55.9 VITAMIN D DEFICIENCY: ICD-10-CM

## 2024-01-08 DIAGNOSIS — M81.8 OTHER OSTEOPOROSIS WITHOUT CURRENT PATHOLOGICAL FRACTURE: ICD-10-CM

## 2024-01-08 DIAGNOSIS — Z00.00 ENCOUNTER FOR HEALTH MAINTENANCE EXAMINATION IN ADULT: Primary | ICD-10-CM

## 2024-01-08 DIAGNOSIS — Z13.6 SCREENING FOR CARDIOVASCULAR CONDITION: ICD-10-CM

## 2024-01-08 DIAGNOSIS — K21.9 GASTROESOPHAGEAL REFLUX DISEASE, UNSPECIFIED WHETHER ESOPHAGITIS PRESENT: ICD-10-CM

## 2024-01-08 PROCEDURE — 99396 PREV VISIT EST AGE 40-64: CPT | Performed by: INTERNAL MEDICINE

## 2024-01-08 RX ORDER — CHOLECALCIFEROL (VITAMIN D3) 125 MCG
50000 TABLET ORAL WEEKLY
Qty: 12 TABLET | Refills: 3 | Status: SHIPPED | OUTPATIENT
Start: 2024-01-08

## 2024-01-08 NOTE — PATIENT INSTRUCTIONS
Please return for fasting labs.    I recommend Shingrix (Shingles vaccine) and the Covid bivalent at the pharmacy.    I recommend an appointment with a Dermatologist to evaluate the skin lesion on your back, as we discussed.    Health Maintenance for Postmenopausal Women  Menopause is a normal process in which your ability to get pregnant comes to an end. This process happens slowly over many months or years, usually between the ages of 48 and 55. Menopause is complete when you have missed your menstrual period for 12 months.  It is important to talk with your health care provider about some of the most common conditions that affect women after menopause (postmenopausal women). These include heart disease, cancer, and bone loss (osteoporosis). Adopting a healthy lifestyle and getting preventive care can help to promote your health and wellness. The actions you take can also lower your chances of developing some of these common conditions.  What are the signs and symptoms of menopause?  During menopause, you may have the following symptoms:  Hot flashes. These can be moderate or severe.  Night sweats.  Decrease in sex drive.  Mood swings.  Headaches.  Tiredness (fatigue).  Irritability.  Memory problems.  Problems falling asleep or staying asleep.  Talk with your health care provider about treatment options for your symptoms.  Do I need hormone replacement therapy?  Hormone replacement therapy is effective in treating symptoms that are caused by menopause, such as hot flashes and night sweats.  Hormone replacement carries certain risks, especially as you become older. If you are thinking about using estrogen or estrogen with progestin, discuss the benefits and risks with your health care provider.  How can I reduce my risk for heart disease and stroke?  The risk of heart disease, heart attack, and stroke increases as you age. One of the causes may be a change in the body's hormones during menopause. This can affect  how your body uses dietary fats, triglycerides, and cholesterol. Heart attack and stroke are medical emergencies. There are many things that you can do to help prevent heart disease and stroke.  Watch your blood pressure  High blood pressure causes heart disease and increases the risk of stroke. This is more likely to develop in people who have high blood pressure readings or are overweight.  Have your blood pressure checked:  Every 3-5 years if you are 18-39 years of age.  Every year if you are 40 years old or older.  Eat a healthy diet    Eat a diet that includes plenty of vegetables, fruits, low-fat dairy products, and lean protein.  Do not eat a lot of foods that are high in solid fats, added sugars, or sodium.  Get regular exercise  Get regular exercise. This is one of the most important things you can do for your health. Most adults should:  Try to exercise for at least 150 minutes each week. The exercise should increase your heart rate and make you sweat (moderate-intensity exercise).  Try to do strengthening exercises at least twice each week. Do these in addition to the moderate-intensity exercise.  Spend less time sitting. Even light physical activity can be beneficial.  Other tips  Work with your health care provider to achieve or maintain a healthy weight.  Do not use any products that contain nicotine or tobacco. These products include cigarettes, chewing tobacco, and vaping devices, such as e-cigarettes. If you need help quitting, ask your health care provider.  Know your numbers. Ask your health care provider to check your cholesterol and your blood sugar (glucose). Continue to have your blood tested as directed by your health care provider.  Do I need screening for cancer?  Depending on your health history and family history, you may need to have cancer screenings at different stages of your life. This may include screening for:  Breast cancer.  Cervical cancer.  Lung cancer.  Colorectal  cancer.  What is my risk for osteoporosis?  After menopause, you may be at increased risk for osteoporosis. Osteoporosis is a condition in which bone destruction happens more quickly than new bone creation. To help prevent osteoporosis or the bone fractures that can happen because of osteoporosis, you may take the following actions:  If you are 19-50 years old, get at least 1,000 mg of calcium and at least 600 international units (IU) of vitamin D per day.  If you are older than age 50 but younger than age 70, get at least 1,200 mg of calcium and at least 600 international units (IU) of vitamin D per day.  If you are older than age 70, get at least 1,200 mg of calcium and at least 800 international units (IU) of vitamin D per day.  Smoking and drinking excessive alcohol increase the risk of osteoporosis. Eat foods that are rich in calcium and vitamin D, and do weight-bearing exercises several times each week as directed by your health care provider.  How does menopause affect my mental health?  Depression may occur at any age, but it is more common as you become older. Common symptoms of depression include:  Feeling depressed.  Changes in sleep patterns.  Changes in appetite or eating patterns.  Feeling an overall lack of motivation or enjoyment of activities that you previously enjoyed.  Frequent crying spells.  Talk with your health care provider if you think that you are experiencing any of these symptoms.  General instructions  See your health care provider for regular wellness exams and vaccines. This may include:  Scheduling regular health, dental, and eye exams.  Getting and maintaining your vaccines. These include:  Influenza vaccine. Get this vaccine each year before the flu season begins.  Pneumonia vaccine.  Shingles vaccine.  Tetanus, diphtheria, and pertussis (Tdap) booster vaccine.  Your health care provider may also recommend other immunizations.  Tell your health care provider if you have ever been  abused or do not feel safe at home.  Summary  Menopause is a normal process in which your ability to get pregnant comes to an end.  This condition causes hot flashes, night sweats, decreased interest in sex, mood swings, headaches, or lack of sleep.  Treatment for this condition may include hormone replacement therapy.  Take actions to keep yourself healthy, including exercising regularly, eating a healthy diet, watching your weight, and checking your blood pressure and blood sugar levels.  Get screened for cancer and depression. Make sure that you are up to date with all your vaccines.  This information is not intended to replace advice given to you by your health care provider. Make sure you discuss any questions you have with your health care provider.  Document Revised: 05/09/2022 Document Reviewed: 05/09/2022  [employer] Patient Education © 2023 [employer]

## 2024-01-08 NOTE — PROGRESS NOTES
Subjective     Chief Complaint:  Physical Exam.    History of Present Illness    History obtained from the patient.    The patient reports a scaly lesion on her left upper back for the past 2 to 3 months.    The patient has Chronic Sacral/Coccygeal/Perineal Pain, onset 2018, etiology undetermined despite extensive work-up.  She states her pain is overall improved.  She is not on any medication.     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     GERD Follow-Up: The patient is here for follow-up of Gastroesophageal Reflux Disease, which has been stable.   Interval Events: She states her GERD flares with antibiotics or other medications.    Symptoms: Reports occasional heartburn and indigestion.  Denies abdominal pain, acid reflux, nausea, vomiting, hematemesis, dysphagia, odynophagia, hematochezia, melena, early satiety, belching, and bloating.   Associated Symptoms: Denies chronic sore throat, cough, hoarseness, wheezing.   Medication: Prilosec as needed.      Osteoporosis Follow-Up: The patient is here for follow-up of Osteoporosis, which has been stable.   Interval Events: She sees Kristen Sotelo DO, an Endocrinologist at , last visit 11/14/2022.  Last DEXA scan done 10/3/2022 showed Osteoporosis lumbar spine and hip per patient report .  He did not feel like she could tolerate oral medication, and Reclast was ordered by her Endocrinologist, but it was not covered by her insurance.  Prolia was discussed, but the patient would need to take oral medication first. On 12/29/2022, Vitamin D level was 52.4.  Symptoms: Has chronic back pain as above.  Denies arthralgias, myalgias, neck pain, hip pain, wrist/hand pain, paresthesias, loss of balance, and gait instability.   Medication: She is currently taking Vitamin D 3, and receives her Calcium from diet.  She is off the Multivitamin.      Sharon Turner is a 58 y.o. female who presents for an Annual Physical.      PMH, PSH, SocHx,  FamHx, Allergies, and Medications: Reviewed and updated.    Outpatient Medications Prior to Visit   Medication Sig Dispense Refill    estradiol (VAGIFEM) 10 MCG tablet vaginal tablet       famciclovir (FAMVIR) 500 MG tablet 1 tablet Daily.      omeprazole (priLOSEC) 20 MG capsule TAKE 1 CAPSULE BY MOUTH ONCE DAILY AS NEEDED FOR ABDOMINAL PAIN AND FOR HEART 90 capsule 3    Ergocalciferol (VITAMIN D2 PO) Take 50,000 Units by mouth Every 30 (Thirty) Days.      Calcium-Phosphorus-Vitamin D (CITRACAL +D3 PO) Take 600 mg by mouth Daily.       No facility-administered medications prior to visit.       Immunization History   Administered Date(s) Administered    COVID-19 (MODERNA) 1st,2nd,3rd Dose Monovalent 12/23/2020, 01/22/2021, 11/15/2021    COVID-19 (PFIZER) BIVALENT 12+YRS 05/28/2023    COVID-19 Non-US (EPIVACCORONA) (Not for HM) 01/15/2021, 02/15/2021    Flu Vaccine Quad PF >36MO 10/30/2020    Hepatitis A 08/13/2020, 12/08/2021    Influenza, Unspecified 11/01/2020, 10/01/2021, 12/01/2022    Tdap 05/14/2008, 10/07/2019         Patient Active Problem List   Diagnosis    Raynaud's disease    GERD (gastroesophageal reflux disease)    Vitamin D deficiency    History of nephrolithiasis    Herpes simplex type 2 infection    Osteoporosis    Visual problems    Chronic pain       Health Habits:  Dental Exam. up to date  Eye Exam. up to date  Hearing Loss:  No  Exercise: 7 times/week.  Current exercise activities include: walking  Diet: Healthy  Multivitamin: No    Safe Driving:  Yes  Seat Belt:  Yes  Bike Helmet:  Yes  Skin Screening:  Yes  Sunscreen: Yes  SBE / DEVONTE: Yes  Sexual Activity:  Not currently  Birth Control:  Menopause  STD Prevention:  N/A    Last Pap: 1/2/2024 (GYN)-results pending  Last Mammogram: 1/23/2021, category 1.  Last DEXA Scan: 10/31/2022, osteoporosis.  Last Colonoscopy: 5/10/2019, mild sigmoid diverticulosis.  Last PSA: N/A    Social:    Social History     Socioeconomic History    Marital status:      Number of children: 1   Tobacco Use    Smoking status: Never     Passive exposure: Past (childhood)    Smokeless tobacco: Never   Vaping Use    Vaping Use: Never used   Substance and Sexual Activity    Alcohol use: Yes     Comment: 1-2 drinks (beer or cocktail) per week    Drug use: Never    Sexual activity: Not Currently     Partners: Male     Birth control/protection: Post-menopausal         Current Medical Providers:    Mirta Mack MD (Internal Medicine / Pediatrics)    The [facility] providers who are involved in the care of this patient are listed above.         Review of Systems   Constitutional:  Negative for chills, fatigue, fever and unexpected weight change.        No night sweats.    HENT:  Negative for congestion, ear discharge, ear pain, facial swelling, hearing loss, nosebleeds, postnasal drip, rhinorrhea, sinus pressure, sinus pain, sneezing, sore throat, tinnitus and voice change.         Denies snoring.   Eyes:  Negative for photophobia, pain, discharge, redness, itching and visual disturbance.   Respiratory:  Negative for cough, chest tightness, shortness of breath and wheezing.         No chest congestion.  No hemoptysis.   Cardiovascular:  Negative for chest pain, palpitations and leg swelling.        No orthopnea, STEVENS, or PND.  No claudication or syncope.   Gastrointestinal:  Negative for abdominal pain, blood in stool, constipation, diarrhea, nausea and vomiting.        Has intermittent heartburn. No melena.  No hematemesis.  No dysphagia or odynophagia.  No early satiety, belching, or bloating.    Endocrine: Negative for cold intolerance, heat intolerance, polydipsia, polyphagia and polyuria.        No hair loss or dry skin.  No hot flashes.     Genitourinary:  Negative for difficulty urinating, dyspareunia, dysuria, flank pain, frequency, hematuria, pelvic pain, urgency, vaginal bleeding and vaginal discharge.        No nocturia, incomplete emptying, or incontinence.  "  Musculoskeletal:  Positive for back pain. Negative for arthralgias, gait problem, joint swelling, myalgias, neck pain and neck stiffness.        No joint stiffness.   Skin:  Negative for rash.        New skin lesion as per HPI on the back.  No changes in existing skin lesions. No breast pain or masses.  No nipple discharge or nipple inversion.   Neurological:  Negative for dizziness, tremors, syncope, speech difficulty, weakness, light-headedness, numbness and headaches.        No tingling.  No memory loss.  No decreased concentration.   Hematological:  Negative for adenopathy. Does not bruise/bleed easily.   Psychiatric/Behavioral:  Negative for confusion, self-injury, sleep disturbance and suicidal ideas. The patient is not nervous/anxious.         No depression.           Objective     Vitals:    01/08/24 1531   BP: 110/70   BP Location: Right arm   Patient Position: Sitting   Cuff Size: Adult   Pulse: 68   Resp: 16   Temp: 97.9 °F (36.6 °C)   TempSrc: Infrared   Weight: 57.6 kg (127 lb)   Height: 171 cm (67.32\")   PainSc: 0-No pain       Body mass index is 19.7 kg/m².    Physical Exam  Vitals and nursing note reviewed.   Constitutional:       Appearance: Normal appearance. She is well-developed and normal weight.   HENT:      Head: Normocephalic and atraumatic.      Right Ear: Tympanic membrane, ear canal and external ear normal.      Left Ear: Tympanic membrane, ear canal and external ear normal.      Mouth/Throat:      Mouth: Mucous membranes are moist. No oral lesions.      Pharynx: Oropharynx is clear.      Comments: Tonsils normal.  Eyes:      Extraocular Movements: Extraocular movements intact.      Conjunctiva/sclera: Conjunctivae normal.      Pupils: Pupils are equal, round, and reactive to light.      Comments: Fundi normal bilaterally.   Neck:      Thyroid: No thyromegaly.      Vascular: No carotid bruit.   Cardiovascular:      Rate and Rhythm: Normal rate and regular rhythm.      Pulses: Normal " pulses.      Heart sounds: Normal heart sounds. No murmur heard.     No friction rub. No gallop.   Pulmonary:      Effort: Pulmonary effort is normal.      Breath sounds: Normal breath sounds.      Comments: The patient declined a breast exam today.  Abdominal:      General: Bowel sounds are normal. There is no distension or abdominal bruit.      Palpations: Abdomen is soft. There is no hepatomegaly, splenomegaly or mass.      Tenderness: There is no abdominal tenderness.   Genitourinary:     Comments:  and rectal exam deferred.  Musculoskeletal:         General: Normal range of motion.      Cervical back: Normal range of motion and neck supple.      Right lower leg: No edema.      Left lower leg: No edema.   Lymphadenopathy:      Cervical: No cervical adenopathy.      Upper Body:      Right upper body: No supraclavicular adenopathy.      Left upper body: No supraclavicular adenopathy.   Skin:     Findings: No rash.      Comments: There is a flesh-colored, scaly, slightly raised, lesion on the left upper back no additional atypical skin lesions.   Neurological:      Mental Status: She is alert.      Cranial Nerves: No cranial nerve deficit.      Motor: Motor function is intact. No abnormal muscle tone.      Coordination: Coordination is intact.      Gait: Gait is intact.      Deep Tendon Reflexes: Reflexes are normal and symmetric.   Psychiatric:         Mood and Affect: Mood normal.         PHQ-2 Depression Screening  Little interest or pleasure in doing things? 0-->not at all   Feeling down, depressed, or hopeless? 0-->not at all   PHQ-2 Total Score 0         Counseling was given to patient for the following topics: appropriate exercise, healthy eating habits, disease prevention, risk factors for cancer, importance of self breast exam and breast health, importance of immunizations, including risks and benefits, bone health, sun safety, seatbelt use, and safe driving. Also discussed the importance of regular  dental and vision care, as well recommendation for a yearly screening skin exam after age 40.  Written information provided to patient on these topics and other health maintenance issues.        Diagnoses and all orders for this visit:    1. Encounter for health maintenance examination in adult (Primary)  -     Lipid Panel; Future  -     Comprehensive Metabolic Panel; Future  -     TSH; Future  -     CBC & Differential; Future    2. Gastroesophageal reflux disease, unspecified whether esophagitis present   Continue current medication(s) as noted in the history of present illness.    3. Other osteoporosis without current pathological fracture  -     Vitamin D,25-Hydroxy; Future   Continue Calcium (dietary) and Vitamin D supplementation, as well as weight bearing exercises.    4. Vitamin D deficiency  -     Ergocalciferol (Vitamin D2) 50 MCG (2000 UT) tablet; Take 50,000 Units by mouth 1 (One) Time Per Week.  Dispense: 12 tablet; Refill: 3- REFILL  -     Vitamin D,25-Hydroxy; Future   Continue Vitamin D supplementation.    5. Screening for cardiovascular condition  -     Lipid Panel; Future  -     Comprehensive Metabolic Panel; Future  -     TSH; Future  -     CBC & Differential; Future      I recommended an appointment with her Dermatologist to evaluate the patient's skin lesion on the back, and she agrees to schedule.    The patient agrees to return for fasting labs.    I recommended Shingrix (Shingles vaccine) and the Covid bivalent at the pharmacy.        BMI is within normal parameters. No other follow-up for BMI required.            Return in about 1 year (around 1/8/2025) for Annual physical, fasting.       (Supplements cause GI upset)

## 2024-01-23 ENCOUNTER — LAB (OUTPATIENT)
Dept: INTERNAL MEDICINE | Facility: CLINIC | Age: 59
End: 2024-01-23
Payer: COMMERCIAL

## 2024-01-23 DIAGNOSIS — Z13.6 SCREENING FOR CARDIOVASCULAR CONDITION: ICD-10-CM

## 2024-01-23 DIAGNOSIS — M81.8 OTHER OSTEOPOROSIS WITHOUT CURRENT PATHOLOGICAL FRACTURE: ICD-10-CM

## 2024-01-23 DIAGNOSIS — Z00.00 ENCOUNTER FOR HEALTH MAINTENANCE EXAMINATION IN ADULT: ICD-10-CM

## 2024-01-23 DIAGNOSIS — E55.9 VITAMIN D DEFICIENCY: ICD-10-CM

## 2024-01-23 LAB
25(OH)D3 SERPL-MCNC: 82.9 NG/ML (ref 30–100)
ALBUMIN SERPL-MCNC: 4.6 G/DL (ref 3.5–5.2)
ALBUMIN/GLOB SERPL: 1.6 G/DL
ALP SERPL-CCNC: 65 U/L (ref 39–117)
ALT SERPL W P-5'-P-CCNC: 14 U/L (ref 1–33)
ANION GAP SERPL CALCULATED.3IONS-SCNC: 9.7 MMOL/L (ref 5–15)
AST SERPL-CCNC: 18 U/L (ref 1–32)
BASOPHILS # BLD AUTO: 0.05 10*3/MM3 (ref 0–0.2)
BASOPHILS NFR BLD AUTO: 1.1 % (ref 0–1.5)
BILIRUB SERPL-MCNC: 1.1 MG/DL (ref 0–1.2)
BUN SERPL-MCNC: 14 MG/DL (ref 6–20)
BUN/CREAT SERPL: 19.4 (ref 7–25)
CALCIUM SPEC-SCNC: 9.6 MG/DL (ref 8.6–10.5)
CHLORIDE SERPL-SCNC: 103 MMOL/L (ref 98–107)
CHOLEST SERPL-MCNC: 223 MG/DL (ref 0–200)
CLUMPED PLATELETS: PRESENT
CO2 SERPL-SCNC: 27.3 MMOL/L (ref 22–29)
CREAT SERPL-MCNC: 0.72 MG/DL (ref 0.57–1)
DEPRECATED RDW RBC AUTO: 42.9 FL (ref 37–54)
EGFRCR SERPLBLD CKD-EPI 2021: 96.5 ML/MIN/1.73
EOSINOPHIL # BLD AUTO: 0.05 10*3/MM3 (ref 0–0.4)
EOSINOPHIL NFR BLD AUTO: 1.1 % (ref 0.3–6.2)
ERYTHROCYTE [DISTWIDTH] IN BLOOD BY AUTOMATED COUNT: 13 % (ref 12.3–15.4)
GLOBULIN UR ELPH-MCNC: 2.9 GM/DL
GLUCOSE SERPL-MCNC: 88 MG/DL (ref 65–99)
HCT VFR BLD AUTO: 42.1 % (ref 34–46.6)
HDLC SERPL-MCNC: 95 MG/DL (ref 40–60)
HGB BLD-MCNC: 13.8 G/DL (ref 12–15.9)
LDLC SERPL CALC-MCNC: 122 MG/DL (ref 0–100)
LDLC/HDLC SERPL: 1.27 {RATIO}
LYMPHOCYTES # BLD AUTO: 1.97 10*3/MM3 (ref 0.7–3.1)
LYMPHOCYTES NFR BLD AUTO: 42.2 % (ref 19.6–45.3)
MCH RBC QN AUTO: 30.1 PG (ref 26.6–33)
MCHC RBC AUTO-ENTMCNC: 32.8 G/DL (ref 31.5–35.7)
MCV RBC AUTO: 91.7 FL (ref 79–97)
MONOCYTES # BLD AUTO: 0.39 10*3/MM3 (ref 0.1–0.9)
MONOCYTES NFR BLD AUTO: 8.4 % (ref 5–12)
NEUTROPHILS NFR BLD AUTO: 2.19 10*3/MM3 (ref 1.7–7)
NEUTROPHILS NFR BLD AUTO: 46.8 % (ref 42.7–76)
PLATELET # BLD AUTO: 145 10*3/MM3 (ref 140–450)
PMV BLD AUTO: 13 FL (ref 6–12)
POTASSIUM SERPL-SCNC: 4.1 MMOL/L (ref 3.5–5.2)
PROT SERPL-MCNC: 7.5 G/DL (ref 6–8.5)
RBC # BLD AUTO: 4.59 10*6/MM3 (ref 3.77–5.28)
RBC MORPH BLD: NORMAL
SMALL PLATELETS BLD QL SMEAR: ADEQUATE
SODIUM SERPL-SCNC: 140 MMOL/L (ref 136–145)
TRIGL SERPL-MCNC: 36 MG/DL (ref 0–150)
TSH SERPL DL<=0.05 MIU/L-ACNC: 2.26 UIU/ML (ref 0.27–4.2)
VLDLC SERPL-MCNC: 6 MG/DL (ref 5–40)
WBC MORPH BLD: NORMAL
WBC NRBC COR # BLD AUTO: 4.67 10*3/MM3 (ref 3.4–10.8)

## 2024-01-23 PROCEDURE — 84443 ASSAY THYROID STIM HORMONE: CPT | Performed by: INTERNAL MEDICINE

## 2024-01-23 PROCEDURE — 80061 LIPID PANEL: CPT | Performed by: INTERNAL MEDICINE

## 2024-01-23 PROCEDURE — 85025 COMPLETE CBC W/AUTO DIFF WBC: CPT | Performed by: INTERNAL MEDICINE

## 2024-01-23 PROCEDURE — 85007 BL SMEAR W/DIFF WBC COUNT: CPT | Performed by: INTERNAL MEDICINE

## 2024-01-23 PROCEDURE — 36415 COLL VENOUS BLD VENIPUNCTURE: CPT | Performed by: INTERNAL MEDICINE

## 2024-01-23 PROCEDURE — 80053 COMPREHEN METABOLIC PANEL: CPT | Performed by: INTERNAL MEDICINE

## 2024-01-23 PROCEDURE — 82306 VITAMIN D 25 HYDROXY: CPT | Performed by: INTERNAL MEDICINE

## 2024-02-23 ENCOUNTER — HOSPITAL ENCOUNTER (OUTPATIENT)
Dept: MAMMOGRAPHY | Facility: HOSPITAL | Age: 59
Discharge: HOME | End: 2024-02-23
Admitting: INTERNAL MEDICINE
Payer: COMMERCIAL

## 2024-02-23 DIAGNOSIS — Z12.31 VISIT FOR SCREENING MAMMOGRAM: ICD-10-CM

## 2024-02-23 PROCEDURE — 77067 SCR MAMMO BI INCL CAD: CPT

## 2024-02-23 PROCEDURE — 77063 BREAST TOMOSYNTHESIS BI: CPT

## 2024-07-16 ENCOUNTER — OFFICE VISIT (OUTPATIENT)
Dept: INTERNAL MEDICINE | Facility: CLINIC | Age: 59
End: 2024-07-16
Payer: COMMERCIAL

## 2024-07-16 VITALS
BODY MASS INDEX: 19.27 KG/M2 | HEART RATE: 64 BPM | DIASTOLIC BLOOD PRESSURE: 64 MMHG | RESPIRATION RATE: 20 BRPM | TEMPERATURE: 97.7 F | WEIGHT: 124.2 LBS | SYSTOLIC BLOOD PRESSURE: 112 MMHG

## 2024-07-16 DIAGNOSIS — G89.29 CHRONIC MIDLINE LOW BACK PAIN WITH LEFT-SIDED SCIATICA: Primary | ICD-10-CM

## 2024-07-16 DIAGNOSIS — R20.2 PARESTHESIA OF LEFT LOWER EXTREMITY: ICD-10-CM

## 2024-07-16 DIAGNOSIS — M54.9 TENDERNESS OF BACK: ICD-10-CM

## 2024-07-16 DIAGNOSIS — R29.898 WEAKNESS OF LEFT LOWER EXTREMITY: ICD-10-CM

## 2024-07-16 DIAGNOSIS — M54.42 CHRONIC MIDLINE LOW BACK PAIN WITH LEFT-SIDED SCIATICA: Primary | ICD-10-CM

## 2024-07-16 PROCEDURE — 99213 OFFICE O/P EST LOW 20 MIN: CPT | Performed by: INTERNAL MEDICINE

## 2024-07-16 NOTE — PROGRESS NOTES
Subjective       Sharon Turner is a 58 y.o. female.     Chief Complaint   Patient presents with    Back Pain       History obtained from the patient.      Back Pain  This is a new problem. Episode onset: 1 month ago. The problem occurs intermittently. The problem has been worse since onset. The pain is present in the lumbar spine. Quality: initially just sore to touch, now tearing with movement (bending forward) The pain does not radiate. The pain is mild. The pain is The same all the time. The symptoms are aggravated by bending and lying down. Stiffness is present: None. Associated symptoms include leg pain (LLE- not new), numbness (feet, slightly worse overall x 1 year), tingling (feet, slightly worse overall x 1 year) and weakness (LLE, not new). Pertinent negatives include no abdominal pain, bladder incontinence, bowel incontinence, chest pain, dysuria, fever, headaches, pelvic pain, perianal numbness or weight loss. Risk factors include history of osteoporosis (No known injury or trauma.  Has a history of Lumbar DDD and Lumbar Sacral Pain). She has tried heat and analgesics (She cannot take NSAIDs) for the symptoms. The treatment provided no relief.          The following portions of the patient's history were reviewed and updated as appropriate: allergies, current medications, past family history, past medical history, past social history, past surgical history, and problem list.      Review of Systems   Constitutional:  Negative for chills, fever, unexpected weight change and weight loss.   Respiratory:  Negative for cough and shortness of breath.    Cardiovascular:  Negative for chest pain.   Gastrointestinal:  Negative for abdominal pain, blood in stool, bowel incontinence, constipation, diarrhea, nausea and vomiting.   Genitourinary:  Positive for frequency (not new). Negative for bladder incontinence, dysuria, hematuria, pelvic pain, urgency, vaginal bleeding and vaginal discharge.   Musculoskeletal:   Positive for back pain. Negative for arthralgias, joint swelling, myalgias, neck pain and neck stiffness.   Skin:  Negative for rash.   Neurological:  Positive for tingling (feet, slightly worse overall x 1 year), weakness (LLE, not new) and numbness (feet, slightly worse overall x 1 year). Negative for headaches.   Hematological:  Negative for adenopathy.           Objective     Blood pressure 112/64, pulse 64, temperature 97.7 °F (36.5 °C), temperature source Infrared, resp. rate 20, weight 56.3 kg (124 lb 3.2 oz).    Physical Exam  Vitals and nursing note reviewed.   Constitutional:       Appearance: She is well-developed and normal weight.   Neck:      Comments: There is no tenderness to palpation of the cervical spine or paraspinal muscles.  Cardiovascular:      Rate and Rhythm: Normal rate and regular rhythm.      Heart sounds: Normal heart sounds. No murmur heard.  Pulmonary:      Effort: Pulmonary effort is normal.      Breath sounds: Normal breath sounds.   Abdominal:      General: Bowel sounds are normal. There is no distension.      Palpations: Abdomen is soft. There is no hepatomegaly, splenomegaly or mass.      Tenderness: There is no abdominal tenderness. There is no right CVA tenderness or left CVA tenderness.      Comments: No CVA tenderness.   Musculoskeletal:         General: Normal range of motion.      Cervical back: Normal range of motion and neck supple.      Comments: There is tenderness to palpation on one spot of the upper lumbar spine.  No erythema, edema, or warmth.  No bruising.  There is no tenderness to palpation over the thoracic spine or lumbar/thoracic paraspinal muscles.  Straight leg raise is negative bilaterally.  There is no pain with right hip flexion, extension, abduction, and adduction.  There is no pain with left hip flexion, extension, abduction, and adduction.   Skin:     Findings: No rash.   Neurological:      Mental Status: She is alert.      Motor: Motor function is  intact.      Gait: Gait is intact.      Deep Tendon Reflexes: Reflexes are normal and symmetric.   Psychiatric:         Mood and Affect: Mood normal.           Assessment & Plan   Diagnoses and all orders for this visit:    1. Chronic midline low back pain with left-sided sciatica (Primary)  -     MRI Lumbar Spine Without Contrast; Future    2. Tenderness of back  -     MRI Lumbar Spine Without Contrast; Future    3. Paresthesia of left lower extremity  -     MRI Lumbar Spine Without Contrast; Future    4. Weakness of left lower extremity  -     MRI Lumbar Spine Without Contrast; Future      Recommended a trial of ice.  She declined a prescription for Voltaren gel today.    Return if symptoms worsen or fail to improve.

## 2024-07-26 ENCOUNTER — HOSPITAL ENCOUNTER (OUTPATIENT)
Dept: MRI IMAGING | Facility: HOSPITAL | Age: 59
Discharge: HOME | End: 2024-07-26
Admitting: INTERNAL MEDICINE
Payer: COMMERCIAL

## 2024-07-26 DIAGNOSIS — R29.898 WEAKNESS OF LEFT LOWER EXTREMITY: ICD-10-CM

## 2024-07-26 DIAGNOSIS — M54.42 CHRONIC MIDLINE LOW BACK PAIN WITH LEFT-SIDED SCIATICA: ICD-10-CM

## 2024-07-26 DIAGNOSIS — G89.29 CHRONIC MIDLINE LOW BACK PAIN WITH LEFT-SIDED SCIATICA: ICD-10-CM

## 2024-07-26 DIAGNOSIS — M54.9 TENDERNESS OF BACK: ICD-10-CM

## 2024-07-26 DIAGNOSIS — R20.2 PARESTHESIA OF LEFT LOWER EXTREMITY: ICD-10-CM

## 2024-07-26 PROCEDURE — 72148 MRI LUMBAR SPINE W/O DYE: CPT

## 2024-08-08 ENCOUNTER — HOSPITAL ENCOUNTER (OUTPATIENT)
Dept: MRI IMAGING | Facility: HOSPITAL | Age: 59
Discharge: HOME | End: 2024-08-08
Payer: COMMERCIAL

## 2024-08-09 DIAGNOSIS — G89.29 OTHER CHRONIC PAIN: Primary | ICD-10-CM

## 2024-09-10 ENCOUNTER — LAB (OUTPATIENT)
Dept: LAB | Facility: HOSPITAL | Age: 59
End: 2024-09-10
Payer: COMMERCIAL

## 2024-09-10 ENCOUNTER — TRANSCRIBE ORDERS (OUTPATIENT)
Dept: LAB | Facility: HOSPITAL | Age: 59
End: 2024-09-10
Payer: COMMERCIAL

## 2024-09-10 DIAGNOSIS — B00.9 HERPES SIMPLEX VIRUS INFECTION: ICD-10-CM

## 2024-09-10 DIAGNOSIS — Z71.89 OTHER SPECIFIED COUNSELING: ICD-10-CM

## 2024-09-10 DIAGNOSIS — M54.6 PAIN IN THORACIC SPINE: ICD-10-CM

## 2024-09-10 DIAGNOSIS — G54.1 LUMBOSACRAL PLEXOPATHY: ICD-10-CM

## 2024-09-10 DIAGNOSIS — G54.1 LUMBOSACRAL PLEXOPATHY: Primary | ICD-10-CM

## 2024-09-10 LAB
ALBUMIN SERPL-MCNC: 4.8 G/DL (ref 3.5–5.2)
ALBUMIN/GLOB SERPL: 1.8 G/DL
ALP SERPL-CCNC: 65 U/L (ref 39–117)
ALT SERPL W P-5'-P-CCNC: 11 U/L (ref 1–33)
ANION GAP SERPL CALCULATED.3IONS-SCNC: 8 MMOL/L (ref 5–15)
AST SERPL-CCNC: 19 U/L (ref 1–32)
BASOPHILS # BLD AUTO: 0.06 10*3/MM3 (ref 0–0.2)
BASOPHILS NFR BLD AUTO: 1 % (ref 0–1.5)
BILIRUB SERPL-MCNC: 1.1 MG/DL (ref 0–1.2)
BUN SERPL-MCNC: 19 MG/DL (ref 6–20)
BUN/CREAT SERPL: 26 (ref 7–25)
CALCIUM SPEC-SCNC: 9.8 MG/DL (ref 8.6–10.5)
CHLORIDE SERPL-SCNC: 98 MMOL/L (ref 98–107)
CO2 SERPL-SCNC: 30 MMOL/L (ref 22–29)
CREAT SERPL-MCNC: 0.73 MG/DL (ref 0.57–1)
DEPRECATED RDW RBC AUTO: 46.1 FL (ref 37–54)
EGFRCR SERPLBLD CKD-EPI 2021: 94.9 ML/MIN/1.73
EOSINOPHIL # BLD AUTO: 0.08 10*3/MM3 (ref 0–0.4)
EOSINOPHIL NFR BLD AUTO: 1.3 % (ref 0.3–6.2)
ERYTHROCYTE [DISTWIDTH] IN BLOOD BY AUTOMATED COUNT: 13.3 % (ref 12.3–15.4)
GLOBULIN UR ELPH-MCNC: 2.6 GM/DL
GLUCOSE SERPL-MCNC: 89 MG/DL (ref 65–99)
HCT VFR BLD AUTO: 42 % (ref 34–46.6)
HGB BLD-MCNC: 13.8 G/DL (ref 12–15.9)
IMM GRANULOCYTES # BLD AUTO: 0.02 10*3/MM3 (ref 0–0.05)
IMM GRANULOCYTES NFR BLD AUTO: 0.3 % (ref 0–0.5)
LYMPHOCYTES # BLD AUTO: 1.84 10*3/MM3 (ref 0.7–3.1)
LYMPHOCYTES NFR BLD AUTO: 29.4 % (ref 19.6–45.3)
MCH RBC QN AUTO: 30.9 PG (ref 26.6–33)
MCHC RBC AUTO-ENTMCNC: 32.9 G/DL (ref 31.5–35.7)
MCV RBC AUTO: 94 FL (ref 79–97)
MONOCYTES # BLD AUTO: 0.46 10*3/MM3 (ref 0.1–0.9)
MONOCYTES NFR BLD AUTO: 7.4 % (ref 5–12)
NEUTROPHILS NFR BLD AUTO: 3.79 10*3/MM3 (ref 1.7–7)
NEUTROPHILS NFR BLD AUTO: 60.6 % (ref 42.7–76)
NRBC BLD AUTO-RTO: 0 /100 WBC (ref 0–0.2)
PLAT MORPH BLD: NORMAL
PLATELET # BLD AUTO: 133 10*3/MM3 (ref 140–450)
PMV BLD AUTO: 13.2 FL (ref 6–12)
POTASSIUM SERPL-SCNC: 4.4 MMOL/L (ref 3.5–5.2)
PROT SERPL-MCNC: 7.4 G/DL (ref 6–8.5)
RBC # BLD AUTO: 4.47 10*6/MM3 (ref 3.77–5.28)
RBC MORPH BLD: NORMAL
SODIUM SERPL-SCNC: 136 MMOL/L (ref 136–145)
WBC MORPH BLD: NORMAL
WBC NRBC COR # BLD AUTO: 6.25 10*3/MM3 (ref 3.4–10.8)

## 2024-09-10 PROCEDURE — 85007 BL SMEAR W/DIFF WBC COUNT: CPT

## 2024-09-10 PROCEDURE — 36415 COLL VENOUS BLD VENIPUNCTURE: CPT

## 2024-09-10 PROCEDURE — 85025 COMPLETE CBC W/AUTO DIFF WBC: CPT

## 2024-09-10 PROCEDURE — 80053 COMPREHEN METABOLIC PANEL: CPT

## 2024-09-13 ENCOUNTER — OFFICE VISIT (OUTPATIENT)
Dept: PAIN MEDICINE | Facility: CLINIC | Age: 59
End: 2024-09-13
Payer: COMMERCIAL

## 2024-09-13 VITALS — WEIGHT: 126 LBS | BODY MASS INDEX: 19.78 KG/M2 | HEIGHT: 67 IN

## 2024-09-13 DIAGNOSIS — M54.16 LUMBAR RADICULOPATHY: Primary | ICD-10-CM

## 2024-09-13 PROCEDURE — 99204 OFFICE O/P NEW MOD 45 MIN: CPT | Performed by: STUDENT IN AN ORGANIZED HEALTH CARE EDUCATION/TRAINING PROGRAM

## 2024-09-16 DIAGNOSIS — G89.29 CHRONIC MIDLINE LOW BACK PAIN WITH LEFT-SIDED SCIATICA: Primary | ICD-10-CM

## 2024-09-16 DIAGNOSIS — R20.2 PARESTHESIA OF LEFT LOWER EXTREMITY: ICD-10-CM

## 2024-09-16 DIAGNOSIS — M54.42 CHRONIC MIDLINE LOW BACK PAIN WITH LEFT-SIDED SCIATICA: Primary | ICD-10-CM

## 2024-09-16 DIAGNOSIS — R29.898 WEAKNESS OF LEFT LOWER EXTREMITY: ICD-10-CM

## 2024-09-18 ENCOUNTER — TELEPHONE (OUTPATIENT)
Dept: PAIN MEDICINE | Facility: CLINIC | Age: 59
End: 2024-09-18
Payer: COMMERCIAL

## 2024-09-24 ENCOUNTER — OUTSIDE FACILITY SERVICE (OUTPATIENT)
Dept: PAIN MEDICINE | Facility: CLINIC | Age: 59
End: 2024-09-24
Payer: COMMERCIAL

## 2024-10-23 ENCOUNTER — PATIENT ROUNDING (BHMG ONLY) (OUTPATIENT)
Dept: URGENT CARE | Facility: CLINIC | Age: 59
End: 2024-10-23
Payer: COMMERCIAL

## 2024-11-08 ENCOUNTER — OFFICE VISIT (OUTPATIENT)
Dept: NEUROSURGERY | Facility: CLINIC | Age: 59
End: 2024-11-08
Payer: COMMERCIAL

## 2024-11-08 VITALS
WEIGHT: 127.5 LBS | BODY MASS INDEX: 20.01 KG/M2 | DIASTOLIC BLOOD PRESSURE: 62 MMHG | HEIGHT: 67 IN | SYSTOLIC BLOOD PRESSURE: 110 MMHG | TEMPERATURE: 96.9 F

## 2024-11-08 DIAGNOSIS — M51.362 DEGENERATION OF INTERVERTEBRAL DISC OF LUMBAR REGION WITH DISCOGENIC BACK PAIN AND LOWER EXTREMITY PAIN: ICD-10-CM

## 2024-11-08 DIAGNOSIS — M19.011 ARTHROPATHY OF RIGHT SHOULDER: ICD-10-CM

## 2024-11-08 DIAGNOSIS — M54.12 CERVICAL RADICULOPATHY: Primary | ICD-10-CM

## 2024-11-08 PROCEDURE — 99204 OFFICE O/P NEW MOD 45 MIN: CPT | Performed by: PHYSICIAN ASSISTANT

## 2024-11-08 NOTE — PROGRESS NOTES
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 She describes periodic episodes of left leg pain that typically improves on its own. This summer, she was increasingly walking and hiking. She developed low back pain that radiates diffusely down the left leg.  Symptoms were not bothering while walking. She was mostly afflicted at rest. She cut back on walking and symptoms have gone away.    For the last week she has experienced pain in the right shoulder that extends down the arm in the forearm.  ROM is limited due to pain. She is unable to take NSAIDS. She has a history of shoulder difficulties but this feels different.       Past Medical History:   Diagnosis Date    Allergic     Chronic pain     Onset - Chronic Sacral/Coccygeal/Perineal Pain, etiology undetermined despite extensive work-up.      COVID-19 virus infection 2022    GERD (gastroesophageal reflux disease)     Dx approx     Herpes simplex type 2 infection     Dx     History of cardiovascular stress test 2014    negative exercise echo, EF 55%-60%    History of Holter monitoring 08/10/2022    SR, rare PACs/PVCs, rare SVT up to 9 beats, rare NSVT up to 10 beats (no symptoms)- Dr. Jacob    History of nephrolithiasis     Dx 2018- s/p extraction    History of varicella     Osteoporosis     Dx     Raynaud's disease     Dx approx 2018    Screening for cardiovascular condition 2020    Coronary artery calcium score 35.9    Visual problems 2021    she is seeing an Ophthalmologist, Dr. Carlos Matos,  at [facility] for deterioration of the vitreous fluid in both eyes, diagnosed in 2020.    Vitamin D deficiency     Dx          Current Outpatient Medications:     Calcium Carbonate-Vit D-Min (Calcium 600+D3  Plus Minerals) 600-800 MG-UNIT chewable tablet, daily (contains vitamin d), Disp: , Rfl:     estradiol (VAGIFEM) 10 MCG tablet vaginal tablet, , Disp: , Rfl:     famciclovir (FAMVIR) 500 MG tablet, 1 tablet Daily., Disp: , Rfl:     omeprazole (priLOSEC) 20 MG capsule, TAKE 1 CAPSULE BY MOUTH ONCE DAILY AS NEEDED FOR ABDOMINAL PAIN AND FOR HEART, Disp: 90 capsule, Rfl: 3    vitamin D (ERGOCALCIFEROL) 1.25 MG (21176 UT) capsule capsule, Take 1 capsule by mouth 1 (One) Time Per Week., Disp: , Rfl:     Past Surgical History:   Procedure Laterality Date    APPENDECTOMY  1973    URETEROLITHOTOMY Left 2018       Social History     Socioeconomic History    Marital status:     Number of children: 1   Tobacco Use    Smoking status: Never     Passive exposure: Past (childhood)    Smokeless tobacco: Never   Vaping Use    Vaping status: Never Used   Substance and Sexual Activity    Alcohol use: Yes     Comment: 1-2 drinks (beer or cocktail) per week    Drug use: Never    Sexual activity: Not Currently     Partners: Male     Birth control/protection: Post-menopausal         Review of Systems   Constitutional:  Positive for activity change. Negative for appetite change, chills, diaphoresis, fatigue, fever and unexpected weight change.   HENT:  Negative for congestion, dental problem, drooling, ear discharge, ear pain, facial swelling, hearing loss, mouth sores, nosebleeds, postnasal drip, rhinorrhea, sinus pressure, sinus pain, sneezing, sore throat, tinnitus, trouble swallowing and voice change.    Eyes:  Negative for photophobia, pain, discharge, redness, itching and visual disturbance.   Respiratory:  Negative for apnea, cough, choking, chest tightness, shortness of breath, wheezing and stridor.    Cardiovascular:  Negative for chest pain, palpitations and leg swelling.   Gastrointestinal:  Negative for abdominal distention, abdominal pain, anal bleeding, blood in stool, constipation, diarrhea, nausea, rectal pain and  "vomiting.   Endocrine: Negative for cold intolerance, heat intolerance, polydipsia, polyphagia and polyuria.   Genitourinary:  Negative for decreased urine volume, difficulty urinating, dyspareunia, dysuria, enuresis, flank pain, frequency, genital sores, hematuria, menstrual problem, pelvic pain, urgency, vaginal bleeding, vaginal discharge and vaginal pain.   Musculoskeletal:  Positive for back pain. Negative for arthralgias, gait problem, joint swelling, myalgias, neck pain and neck stiffness.   Skin:  Negative for color change, pallor, rash and wound.   Allergic/Immunologic: Negative for environmental allergies, food allergies and immunocompromised state.   Neurological:  Negative for dizziness, tremors, seizures, syncope, facial asymmetry, speech difficulty, weakness, light-headedness, numbness and headaches.   Hematological:  Negative for adenopathy. Does not bruise/bleed easily.   Psychiatric/Behavioral:  Negative for agitation, behavioral problems, confusion, decreased concentration, dysphoric mood, hallucinations, self-injury, sleep disturbance and suicidal ideas. The patient is not nervous/anxious and is not hyperactive.        Objective   Vital Signs: Blood pressure 110/62, temperature 96.9 °F (36.1 °C), temperature source Infrared, height 170.2 cm (67\"), weight 57.8 kg (127 lb 8 oz).  Physical Exam  Vitals and nursing note reviewed.   Constitutional:       General: She is not in acute distress.     Appearance: She is well-developed.   HENT:      Head: Normocephalic and atraumatic.   Psychiatric:         Behavior: Behavior normal.         Thought Content: Thought content normal.     Musculoskeletal:     Strength is intact in upper and lower extremities to direct testing. Pain in right shoulder with ranging.      Station and gait are normal.  Neurologic:     Sensation is intact to light touch throughout.     Patient is oriented to person, place, and time.         Independent review of radiographic imaging: " MRI lumbar spine demonstrates some mild multilevel degenerative disc disease.  Small disc bulge at L5-S1 with annular fissure.  Facet arthropathy.  No neuroforaminal stenosis or canal stenosis.    Assessment & Plan   Diagnosis:  Lumbar degenerative disc disease with exacerbation  Cervical radiculopathy  Right shoulder osteoarthritis.     Medical Decision Making: I reviewed MRI with patient in the room and offered reassurance. She should be able to return to her walking regimen without difficulty. I advised her on good body mechanics and stretching prior to exercise.  I am sending her to physical therapy for her neck.   I will see her back in 6 weeks. If symptoms do not improve then we will consider getting an MRI.     Diagnoses and all orders for this visit:    1. Cervical radiculopathy (Primary)  -     Cancel: Ambulatory Referral to Physical Therapy for Evaluation & Treatment  -     Ambulatory Referral to Physical Therapy for Evaluation & Treatment    2. Degeneration of intervertebral disc of lumbar region with discogenic back pain and lower extremity pain    3. Arthropathy of right shoulder                        BMI is within normal parameters. No other follow-up for BMI required.         Rosalba Massey PA-C  Patient Care Team:  Mirta Mack MD as PCP - General (Internal Medicine)

## 2024-11-12 ENCOUNTER — TELEPHONE (OUTPATIENT)
Dept: PAIN MEDICINE | Facility: CLINIC | Age: 59
End: 2024-11-12
Payer: COMMERCIAL

## 2024-11-12 NOTE — TELEPHONE ENCOUNTER
Surgery/Procedure:  left paramedian lumbar interlaminar epidural steroid injection   Surgery/Procedure Date:  11/21/2024-Cancelled  Last visit:   Office Visit with Hernan Goldstein MD (09/13/2024)   Next visit: 12/19/2024-Cancelled     Reason for call:    S/w patient and she states the reason for procedure cancellation is that she is feeling better. She also cancelled her follow up appointment with Dilip in December. Patient will call the office back if she needs an appointment.     [facility] and [place] notified.

## 2024-11-12 NOTE — TELEPHONE ENCOUNTER
Provider: LOUISE ARMENTA    Caller: Sharon Turner    Relationship to Patient: Self    Phone Number: [phone]    Reason for Call: PATIENT CALLED  WANTING TO CANCEL THEIR 11/21/24 LUMBAR EPIDURAL. DOES NOT WANT TO RESCHEDULE AT THIS TIME. PLEASE ADVISE.

## 2024-12-11 ENCOUNTER — OFFICE VISIT (OUTPATIENT)
Dept: NEUROSURGERY | Facility: CLINIC | Age: 59
End: 2024-12-11
Payer: COMMERCIAL

## 2024-12-11 VITALS — WEIGHT: 126.7 LBS | TEMPERATURE: 97.1 F | HEIGHT: 67 IN | BODY MASS INDEX: 19.89 KG/M2

## 2024-12-11 DIAGNOSIS — M54.50 MYOFASCIAL LOW BACK PAIN: ICD-10-CM

## 2024-12-11 DIAGNOSIS — M54.9 MUSCULOSKELETAL BACK PAIN: Primary | ICD-10-CM

## 2024-12-11 PROCEDURE — 99213 OFFICE O/P EST LOW 20 MIN: CPT | Performed by: PHYSICIAN ASSISTANT

## 2024-12-11 RX ORDER — CYCLOBENZAPRINE HCL 10 MG
10 TABLET ORAL 2 TIMES DAILY PRN
Qty: 45 TABLET | Refills: 0 | Status: SHIPPED | OUTPATIENT
Start: 2024-12-11

## 2024-12-11 NOTE — PROGRESS NOTES
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 She describes periodic episodes of left leg pain that typically improves on its own. This summer, she was increasingly walking and hiking. She developed low back pain that radiates diffusely down the left leg.  Symptoms were not bothering while walking. She was mostly afflicted at rest. She cut back on walking and symptoms have gone away.    For the last week she has experienced pain in the right shoulder that extends down the arm in the forearm.  ROM is limited due to pain. She is unable to take NSAIDS. She has a history of shoulder difficulties but this feels different.     Today she returns for follow-up.  She reports the right shoulder pain resolved on its own.  She did not have to go to physical therapy.  However, shortly after our last appointment she began experiencing more pain in the mid spine that radiates into the lumbar spine.  She feels a knot in the thoracic region around the left paraspinal tissues.  If feels sore to the touch.  No radicular complaints.  She has not returned to her walking regimen for concerns that pain will get worse      Past Medical History:   Diagnosis Date    Allergic     Chronic pain     Onset - Chronic Sacral/Coccygeal/Perineal Pain, etiology undetermined despite extensive work-up.      COVID-19 virus infection 2022    GERD (gastroesophageal reflux disease)     Dx approx     Herpes simplex type 2 infection     Dx     History of cardiovascular stress test 2014    negative exercise echo, EF 55%-60%    History of Holter monitoring 08/10/2022    SR, rare PACs/PVCs, rare SVT up to 9 beats, rare NSVT up to 10 beats (no symptoms)- Dr. Jacob    History of nephrolithiasis     Dx 2018- s/p extraction    History  of varicella     Osteoporosis     Dx 6/20    Raynaud's disease     Dx approx 2018    Screening for cardiovascular condition 09/02/2020    Coronary artery calcium score 35.9    Visual problems 12/16/2021    she is seeing an Ophthalmologist, Dr. Carlos Matos,  at [facility] for deterioration of the vitreous fluid in both eyes, diagnosed in July 2020.    Vitamin D deficiency     Dx 6/20         Current Outpatient Medications:     Calcium Carbonate-Vit D-Min (Calcium 600+D3 Plus Minerals) 600-800 MG-UNIT chewable tablet, daily (contains vitamin d), Disp: , Rfl:     estradiol (VAGIFEM) 10 MCG tablet vaginal tablet, , Disp: , Rfl:     famciclovir (FAMVIR) 500 MG tablet, 1 tablet Daily., Disp: , Rfl:     omeprazole (priLOSEC) 20 MG capsule, TAKE 1 CAPSULE BY MOUTH ONCE DAILY AS NEEDED FOR ABDOMINAL PAIN AND FOR HEART, Disp: 90 capsule, Rfl: 3    vitamin D (ERGOCALCIFEROL) 1.25 MG (31082 UT) capsule capsule, Take 1 capsule by mouth 1 (One) Time Per Week., Disp: , Rfl:     cyclobenzaprine (FLEXERIL) 10 MG tablet, Take 1 tablet by mouth 2 (Two) Times a Day As Needed for Muscle Spasms., Disp: 45 tablet, Rfl: 0    Past Surgical History:   Procedure Laterality Date    APPENDECTOMY  1973    URETEROLITHOTOMY Left 2018       Social History     Socioeconomic History    Marital status:     Number of children: 1   Tobacco Use    Smoking status: Never     Passive exposure: Past (childhood)    Smokeless tobacco: Never   Vaping Use    Vaping status: Never Used   Substance and Sexual Activity    Alcohol use: Yes     Comment: 1-2 drinks (beer or cocktail) per week    Drug use: Never    Sexual activity: Not Currently     Partners: Male     Birth control/protection: Post-menopausal         Review of Systems   Constitutional:  Positive for activity change. Negative for appetite change, chills, diaphoresis, fatigue, fever and unexpected weight change.   HENT:  Negative for congestion, dental problem, drooling, ear discharge, ear  "pain, facial swelling, hearing loss, mouth sores, nosebleeds, postnasal drip, rhinorrhea, sinus pressure, sinus pain, sneezing, sore throat, tinnitus, trouble swallowing and voice change.    Eyes:  Negative for photophobia, pain, discharge, redness, itching and visual disturbance.   Respiratory:  Negative for apnea, cough, choking, chest tightness, shortness of breath, wheezing and stridor.    Cardiovascular:  Negative for chest pain, palpitations and leg swelling.   Gastrointestinal:  Negative for abdominal distention, abdominal pain, anal bleeding, blood in stool, constipation, diarrhea, nausea, rectal pain and vomiting.   Endocrine: Negative for cold intolerance, heat intolerance, polydipsia, polyphagia and polyuria.   Genitourinary:  Negative for decreased urine volume, difficulty urinating, dyspareunia, dysuria, enuresis, flank pain, frequency, genital sores, hematuria, menstrual problem, pelvic pain, urgency, vaginal bleeding, vaginal discharge and vaginal pain.   Musculoskeletal:  Positive for back pain. Negative for arthralgias, gait problem, joint swelling, myalgias, neck pain and neck stiffness.   Skin:  Negative for color change, pallor, rash and wound.   Allergic/Immunologic: Negative for environmental allergies, food allergies and immunocompromised state.   Neurological:  Negative for dizziness, tremors, seizures, syncope, facial asymmetry, speech difficulty, weakness, light-headedness, numbness and headaches.   Hematological:  Negative for adenopathy. Does not bruise/bleed easily.   Psychiatric/Behavioral:  Negative for agitation, behavioral problems, confusion, decreased concentration, dysphoric mood, hallucinations, self-injury, sleep disturbance and suicidal ideas. The patient is not nervous/anxious and is not hyperactive.        Objective   Vital Signs: Temperature 97.1 °F (36.2 °C), temperature source Infrared, height 170.2 cm (67\"), weight 57.5 kg (126 lb 11.2 oz).  Physical Exam  Vitals and " nursing note reviewed.   Constitutional:       General: She is not in acute distress.     Appearance: She is well-developed.   HENT:      Head: Normocephalic and atraumatic.   Psychiatric:         Behavior: Behavior normal.         Thought Content: Thought content normal.     Musculoskeletal:     Strength is intact in upper and lower extremities to direct testing. Pain in right shoulder with ranging.      Station and gait are normal.  Tightness and muscle spasm in the left paraspinal thoracic tissues  Neurologic:     Sensation is intact to light touch throughout.     Patient is oriented to person, place, and time.         Independent review of radiographic imaging: MRI lumbar spine demonstrates some mild multilevel degenerative disc disease.  Small disc bulge at L5-S1 with annular fissure.  Facet arthropathy.  No neuroforaminal stenosis or canal stenosis.    Assessment & Plan   Diagnosis:  Myofascial back pain  Cervical radiculopathy  Right shoulder osteoarthritis.     Medical Decision Making: Once again I am going to refer patient to physical therapy for myofascial back pain.  Hopefully she will be able to work through this with modalities and stretching.  Fortunately most of her other symptoms have resolved.  If she continues with the back pain she may consider calling pain management again for consideration of something like a trigger point injection.  I am happy to see her in a few months if symptoms do not improve.    Diagnoses and all orders for this visit:    1. Musculoskeletal back pain (Primary)  -     Ambulatory Referral to Physical Therapy for Evaluation & Treatment    2. Myofascial low back pain  -     Ambulatory Referral to Physical Therapy for Evaluation & Treatment    Other orders  -     cyclobenzaprine (FLEXERIL) 10 MG tablet; Take 1 tablet by mouth 2 (Two) Times a Day As Needed for Muscle Spasms.  Dispense: 45 tablet; Refill: 0                          BMI is within normal parameters. No other  follow-up for BMI required.         Rosalba Massey PA-C  Patient Care Team:  Mirta Mack MD as PCP - General (Internal Medicine)

## 2025-01-15 ENCOUNTER — TELEPHONE (OUTPATIENT)
Dept: INTERNAL MEDICINE | Facility: CLINIC | Age: 60
End: 2025-01-15

## 2025-01-15 DIAGNOSIS — K21.9 GASTROESOPHAGEAL REFLUX DISEASE, UNSPECIFIED WHETHER ESOPHAGITIS PRESENT: ICD-10-CM

## 2025-01-15 DIAGNOSIS — M81.8 OTHER OSTEOPOROSIS WITHOUT CURRENT PATHOLOGICAL FRACTURE: ICD-10-CM

## 2025-01-15 DIAGNOSIS — Z79.899 HIGH RISK MEDICATION USE: ICD-10-CM

## 2025-01-15 DIAGNOSIS — E55.9 VITAMIN D DEFICIENCY: ICD-10-CM

## 2025-01-15 DIAGNOSIS — Z00.00 ENCOUNTER FOR HEALTH MAINTENANCE EXAMINATION IN ADULT: Primary | ICD-10-CM

## 2025-01-15 DIAGNOSIS — Z13.6 SCREENING FOR CARDIOVASCULAR CONDITION: ICD-10-CM

## 2025-01-15 NOTE — TELEPHONE ENCOUNTER
Caller: Sharon Turner    Relationship: Self    Best call back number: [phone]     What orders are you requesting (i.e. lab or imaging): ROUTINE FASTING LAB ORDER'S     In what timeframe would the patient need to come in: AS SOON AS POSSIBLE     Where will you receive your lab/imaging services: IN OFFICE     Additional notes: PATIENT WOULD LIKE FOR ROUTINE FASTING LAB ORDER'S TO BE PLACED IN CHART TO HAVE LABS DRAWN BEFORE SCHEDULED APPOINTMENT 01/23/2025

## 2025-01-16 ENCOUNTER — LAB (OUTPATIENT)
Dept: INTERNAL MEDICINE | Facility: CLINIC | Age: 60
End: 2025-01-16
Payer: COMMERCIAL

## 2025-01-16 DIAGNOSIS — M81.8 OTHER OSTEOPOROSIS WITHOUT CURRENT PATHOLOGICAL FRACTURE: ICD-10-CM

## 2025-01-16 DIAGNOSIS — K21.9 GASTROESOPHAGEAL REFLUX DISEASE, UNSPECIFIED WHETHER ESOPHAGITIS PRESENT: ICD-10-CM

## 2025-01-16 DIAGNOSIS — E55.9 VITAMIN D DEFICIENCY: ICD-10-CM

## 2025-01-16 DIAGNOSIS — Z00.00 ENCOUNTER FOR HEALTH MAINTENANCE EXAMINATION IN ADULT: ICD-10-CM

## 2025-01-16 DIAGNOSIS — Z79.899 HIGH RISK MEDICATION USE: ICD-10-CM

## 2025-01-16 DIAGNOSIS — Z13.6 SCREENING FOR CARDIOVASCULAR CONDITION: ICD-10-CM

## 2025-01-16 LAB
25(OH)D3 SERPL-MCNC: 64.7 NG/ML (ref 30–100)
ALBUMIN SERPL-MCNC: 4.1 G/DL (ref 3.5–5.2)
ALBUMIN/GLOB SERPL: 1.2 G/DL
ALP SERPL-CCNC: 62 U/L (ref 39–117)
ALT SERPL W P-5'-P-CCNC: 14 U/L (ref 1–33)
ANION GAP SERPL CALCULATED.3IONS-SCNC: 7.3 MMOL/L (ref 5–15)
AST SERPL-CCNC: 24 U/L (ref 1–32)
BASOPHILS # BLD AUTO: 0.05 10*3/MM3 (ref 0–0.2)
BASOPHILS NFR BLD AUTO: 1.4 % (ref 0–1.5)
BILIRUB BLD-MCNC: NEGATIVE MG/DL
BILIRUB SERPL-MCNC: 0.9 MG/DL (ref 0–1.2)
BUN SERPL-MCNC: 15 MG/DL (ref 8–23)
BUN/CREAT SERPL: 18.3 (ref 7–25)
CALCIUM SPEC-SCNC: 9.5 MG/DL (ref 8.6–10.5)
CHLORIDE SERPL-SCNC: 103 MMOL/L (ref 98–107)
CHOLEST SERPL-MCNC: 206 MG/DL (ref 0–200)
CLARITY, POC: CLEAR
CLUMPED PLATELETS: NORMAL
CO2 SERPL-SCNC: 28.7 MMOL/L (ref 22–29)
COLOR UR: YELLOW
CREAT SERPL-MCNC: 0.82 MG/DL (ref 0.57–1)
DEPRECATED RDW RBC AUTO: 42.4 FL (ref 37–54)
EGFRCR SERPLBLD CKD-EPI 2021: 82 ML/MIN/1.73
EOSINOPHIL # BLD AUTO: 0.05 10*3/MM3 (ref 0–0.4)
EOSINOPHIL NFR BLD AUTO: 1.4 % (ref 0.3–6.2)
ERYTHROCYTE [DISTWIDTH] IN BLOOD BY AUTOMATED COUNT: 12.6 % (ref 12.3–15.4)
EXPIRATION DATE: NORMAL
GLOBULIN UR ELPH-MCNC: 3.4 GM/DL
GLUCOSE SERPL-MCNC: 84 MG/DL (ref 65–99)
GLUCOSE UR STRIP-MCNC: NEGATIVE MG/DL
HCT VFR BLD AUTO: 43.5 % (ref 34–46.6)
HDLC SERPL-MCNC: 90 MG/DL (ref 40–60)
HGB BLD-MCNC: 13.5 G/DL (ref 12–15.9)
IMM GRANULOCYTES # BLD AUTO: 0.01 10*3/MM3 (ref 0–0.05)
IMM GRANULOCYTES NFR BLD AUTO: 0.3 % (ref 0–0.5)
KETONES UR QL: NEGATIVE
LDLC SERPL CALC-MCNC: 109 MG/DL (ref 0–100)
LDLC/HDLC SERPL: 1.21 {RATIO}
LEUKOCYTE EST, POC: NEGATIVE
LYMPHOCYTES # BLD AUTO: 1.49 10*3/MM3 (ref 0.7–3.1)
LYMPHOCYTES NFR BLD AUTO: 41.6 % (ref 19.6–45.3)
Lab: NORMAL
MAGNESIUM SERPL-MCNC: 2 MG/DL (ref 1.6–2.4)
MCH RBC QN AUTO: 28.8 PG (ref 26.6–33)
MCHC RBC AUTO-ENTMCNC: 31 G/DL (ref 31.5–35.7)
MCV RBC AUTO: 92.9 FL (ref 79–97)
MONOCYTES # BLD AUTO: 0.54 10*3/MM3 (ref 0.1–0.9)
MONOCYTES NFR BLD AUTO: 15.1 % (ref 5–12)
NEUTROPHILS NFR BLD AUTO: 1.44 10*3/MM3 (ref 1.7–7)
NEUTROPHILS NFR BLD AUTO: 40.2 % (ref 42.7–76)
NITRITE UR-MCNC: NEGATIVE MG/ML
NRBC BLD AUTO-RTO: 0 /100 WBC (ref 0–0.2)
PH UR: 6 [PH] (ref 5–8)
PLATELET # BLD AUTO: 123 10*3/MM3 (ref 140–450)
PMV BLD AUTO: 13 FL (ref 6–12)
POTASSIUM SERPL-SCNC: 4.1 MMOL/L (ref 3.5–5.2)
PROT SERPL-MCNC: 7.5 G/DL (ref 6–8.5)
PROT UR STRIP-MCNC: NEGATIVE MG/DL
RBC # BLD AUTO: 4.68 10*6/MM3 (ref 3.77–5.28)
RBC # UR STRIP: NEGATIVE /UL
RBC MORPH BLD: NORMAL
SODIUM SERPL-SCNC: 139 MMOL/L (ref 136–145)
SP GR UR: 1.02 (ref 1–1.03)
TRIGL SERPL-MCNC: 37 MG/DL (ref 0–150)
TSH SERPL DL<=0.05 MIU/L-ACNC: 2.02 UIU/ML (ref 0.27–4.2)
UROBILINOGEN UR QL: NORMAL
VLDLC SERPL-MCNC: 7 MG/DL (ref 5–40)
WBC MORPH BLD: NORMAL
WBC NRBC COR # BLD AUTO: 3.58 10*3/MM3 (ref 3.4–10.8)

## 2025-01-16 PROCEDURE — 85025 COMPLETE CBC W/AUTO DIFF WBC: CPT | Performed by: INTERNAL MEDICINE

## 2025-01-16 PROCEDURE — 80053 COMPREHEN METABOLIC PANEL: CPT | Performed by: INTERNAL MEDICINE

## 2025-01-16 PROCEDURE — 36415 COLL VENOUS BLD VENIPUNCTURE: CPT | Performed by: INTERNAL MEDICINE

## 2025-01-16 PROCEDURE — 80061 LIPID PANEL: CPT | Performed by: INTERNAL MEDICINE

## 2025-01-16 PROCEDURE — 81003 URINALYSIS AUTO W/O SCOPE: CPT | Performed by: INTERNAL MEDICINE

## 2025-01-16 PROCEDURE — 84443 ASSAY THYROID STIM HORMONE: CPT | Performed by: INTERNAL MEDICINE

## 2025-01-16 PROCEDURE — 83735 ASSAY OF MAGNESIUM: CPT | Performed by: INTERNAL MEDICINE

## 2025-01-16 PROCEDURE — 82306 VITAMIN D 25 HYDROXY: CPT | Performed by: INTERNAL MEDICINE

## 2025-01-16 PROCEDURE — 85007 BL SMEAR W/DIFF WBC COUNT: CPT | Performed by: INTERNAL MEDICINE

## 2025-01-23 ENCOUNTER — TELEPHONE (OUTPATIENT)
Dept: INTERNAL MEDICINE | Facility: CLINIC | Age: 60
End: 2025-01-23

## 2025-01-23 ENCOUNTER — OFFICE VISIT (OUTPATIENT)
Dept: INTERNAL MEDICINE | Facility: CLINIC | Age: 60
End: 2025-01-23
Payer: COMMERCIAL

## 2025-01-23 VITALS
BODY MASS INDEX: 19.52 KG/M2 | DIASTOLIC BLOOD PRESSURE: 58 MMHG | RESPIRATION RATE: 18 BRPM | TEMPERATURE: 97.3 F | WEIGHT: 124.38 LBS | HEART RATE: 64 BPM | HEIGHT: 67 IN | SYSTOLIC BLOOD PRESSURE: 108 MMHG

## 2025-01-23 DIAGNOSIS — M81.8 OTHER OSTEOPOROSIS WITHOUT CURRENT PATHOLOGICAL FRACTURE: ICD-10-CM

## 2025-01-23 DIAGNOSIS — M25.511 CHRONIC RIGHT SHOULDER PAIN: ICD-10-CM

## 2025-01-23 DIAGNOSIS — Z12.11 SCREENING FOR COLON CANCER: ICD-10-CM

## 2025-01-23 DIAGNOSIS — G89.29 CHRONIC RIGHT SHOULDER PAIN: ICD-10-CM

## 2025-01-23 DIAGNOSIS — G89.29 CHRONIC LOW BACK PAIN, UNSPECIFIED BACK PAIN LATERALITY, UNSPECIFIED WHETHER SCIATICA PRESENT: ICD-10-CM

## 2025-01-23 DIAGNOSIS — Z86.0100 PERSONAL HISTORY OF COLON POLYPS, UNSPECIFIED: ICD-10-CM

## 2025-01-23 DIAGNOSIS — Z00.00 ENCOUNTER FOR HEALTH MAINTENANCE EXAMINATION IN ADULT: Primary | ICD-10-CM

## 2025-01-23 DIAGNOSIS — M54.50 CHRONIC LOW BACK PAIN, UNSPECIFIED BACK PAIN LATERALITY, UNSPECIFIED WHETHER SCIATICA PRESENT: ICD-10-CM

## 2025-01-23 DIAGNOSIS — K21.9 GASTROESOPHAGEAL REFLUX DISEASE, UNSPECIFIED WHETHER ESOPHAGITIS PRESENT: ICD-10-CM

## 2025-01-23 DIAGNOSIS — E55.9 VITAMIN D DEFICIENCY: ICD-10-CM

## 2025-01-23 PROCEDURE — 99396 PREV VISIT EST AGE 40-64: CPT | Performed by: INTERNAL MEDICINE

## 2025-01-23 PROCEDURE — 99214 OFFICE O/P EST MOD 30 MIN: CPT | Performed by: INTERNAL MEDICINE

## 2025-01-23 NOTE — PATIENT INSTRUCTIONS
I recommend Shingrix (Shingles vaccine) at the pharmacy, as we discussed I also recommend the COVID-19 bivalent vaccine, in office.  Or at the pharmacy, as we discussed.    Health Maintenance for Postmenopausal Women  Menopause is a normal process in which your ability to get pregnant comes to an end. This process happens slowly over many months or years, usually between the ages of 48 and 55. Menopause is complete when you have missed your menstrual period for 12 months.  It is important to talk with your health care provider about some of the most common conditions that affect women after menopause (postmenopausal women). These include heart disease, cancer, and bone loss (osteoporosis). Adopting a healthy lifestyle and getting preventive care can help to promote your health and wellness. The actions you take can also lower your chances of developing some of these common conditions.  What are the signs and symptoms of menopause?  During menopause, you may have the following symptoms:  Hot flashes. These can be moderate or severe.  Night sweats.  Decrease in sex drive.  Mood swings.  Headaches.  Tiredness (fatigue).  Irritability.  Memory problems.  Problems falling asleep or staying asleep.  Talk with your health care provider about treatment options for your symptoms.  Do I need hormone replacement therapy?  Hormone replacement therapy is effective in treating symptoms that are caused by menopause, such as hot flashes and night sweats.  Hormone replacement carries certain risks, especially as you become older. If you are thinking about using estrogen or estrogen with progestin, discuss the benefits and risks with your health care provider.  How can I reduce my risk for heart disease and stroke?  The risk of heart disease, heart attack, and stroke increases as you age. One of the causes may be a change in the body's hormones during menopause. This can affect how your body uses dietary fats, triglycerides, and  cholesterol. Heart attack and stroke are medical emergencies. There are many things that you can do to help prevent heart disease and stroke.  Watch your blood pressure  High blood pressure causes heart disease and increases the risk of stroke. This is more likely to develop in people who have high blood pressure readings or are overweight.  Have your blood pressure checked:  Every 3-5 years if you are 18-39 years of age.  Every year if you are 40 years old or older.  Eat a healthy diet    Eat a diet that includes plenty of vegetables, fruits, low-fat dairy products, and lean protein.  Do not eat a lot of foods that are high in solid fats, added sugars, or sodium.  Get regular exercise  Get regular exercise. This is one of the most important things you can do for your health. Most adults should:  Try to exercise for at least 150 minutes each week. The exercise should increase your heart rate and make you sweat (moderate-intensity exercise).  Try to do strengthening exercises at least twice each week. Do these in addition to the moderate-intensity exercise.  Spend less time sitting. Even light physical activity can be beneficial.  Other tips  Work with your health care provider to achieve or maintain a healthy weight.  Do not use any products that contain nicotine or tobacco. These products include cigarettes, chewing tobacco, and vaping devices, such as e-cigarettes. If you need help quitting, ask your health care provider.  Know your numbers. Ask your health care provider to check your cholesterol and your blood sugar (glucose). Continue to have your blood tested as directed by your health care provider.  Do I need screening for cancer?  Depending on your health history and family history, you may need to have cancer screenings at different stages of your life. This may include screening for:  Breast cancer.  Cervical cancer.  Lung cancer.  Colorectal cancer.  What is my risk for osteoporosis?  After menopause, you  may be at increased risk for osteoporosis. Osteoporosis is a condition in which bone destruction happens more quickly than new bone creation. To help prevent osteoporosis or the bone fractures that can happen because of osteoporosis, you may take the following actions:  If you are 19-50 years old, get at least 1,000 mg of calcium and at least 600 international units (IU) of vitamin D per day.  If you are older than age 50 but younger than age 70, get at least 1,200 mg of calcium and at least 600 international units (IU) of vitamin D per day.  If you are older than age 70, get at least 1,200 mg of calcium and at least 800 international units (IU) of vitamin D per day.  Smoking and drinking excessive alcohol increase the risk of osteoporosis. Eat foods that are rich in calcium and vitamin D, and do weight-bearing exercises several times each week as directed by your health care provider.  How does menopause affect my mental health?  Depression may occur at any age, but it is more common as you become older. Common symptoms of depression include:  Feeling depressed.  Changes in sleep patterns.  Changes in appetite or eating patterns.  Feeling an overall lack of motivation or enjoyment of activities that you previously enjoyed.  Frequent crying spells.  Talk with your health care provider if you think that you are experiencing any of these symptoms.  General instructions  See your health care provider for regular wellness exams and vaccines. This may include:  Scheduling regular health, dental, and eye exams.  Getting and maintaining your vaccines. These include:  Influenza vaccine. Get this vaccine each year before the flu season begins.  Pneumonia vaccine.  Shingles vaccine.  Tetanus, diphtheria, and pertussis (Tdap) booster vaccine.  Your health care provider may also recommend other immunizations.  Tell your health care provider if you have ever been abused or do not feel safe at home.  Summary  Menopause is a  normal process in which your ability to get pregnant comes to an end.  This condition causes hot flashes, night sweats, decreased interest in sex, mood swings, headaches, or lack of sleep.  Treatment for this condition may include hormone replacement therapy.  Take actions to keep yourself healthy, including exercising regularly, eating a healthy diet, watching your weight, and checking your blood pressure and blood sugar levels.  Get screened for cancer and depression. Make sure that you are up to date with all your vaccines.  This information is not intended to replace advice given to you by your health care provider. Make sure you discuss any questions you have with your health care provider.  Document Revised: 05/09/2022 Document Reviewed: 05/09/2022  Elsevier Patient Education © 2024 [employer]

## 2025-01-23 NOTE — PROGRESS NOTES
Subjective     Chief Complaint:  Physical Exam.    Chief Complaint   Patient presents with    Annual Exam    Pain     Follow up    Osteoporosis    GERD       History of Present Illness    History obtained from the patient.    The patient has Chronic Sacral/Coccygeal/Perineal Pain, onset 2018, etiology undetermined despite extensive work-up, which is overall improved.  She takes Ibuprofen as needed.    She was seen in office on 7/6/2016/24 for new onset low back pain.  MRI on 7/26/2024 showed: Minimal degenerative changes of the lumbar spine without substantial canal or foraminal stenosis. There is an annular fissure at L5-S1.  She was referred to Pain Management and saw Dr. Goldstein on 9/13/2024.  She states he did not feel like injections would help her pain.  He referred her to Neurosurgery.  She states they felt that her pain was coming from [facility]  She denies neck pain, but does have significant right shoulder pain (for several months).  She has been in PT for 3 weeks and her therapist feels her pain is coming more from her right shoulder then from her neck.  She states she has seen Dr. Galicia several years ago for shoulder pain, but does not remember which shoulder was affected.  She takes Ibuprofen as needed.     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    Per Colonoscopy report on 5/10/2019, which showed mild sigmoid diverticulosis otherwise normal, follow-up was recommended in 5 years due to a previous history of Colon Polyps.  The patient states she has gotten a letter to schedule her follow-up Colonoscopy, but has ignored it.  She denies abdominal pain, diarrhea, constipation, melena, hematochezia, and changes in the stool.     GERD Follow-Up: The patient is here for follow-up of Gastroesophageal Reflux Disease, which has been stable.   Interval Events: She states her GERD flares with antibiotics or other medications.    Symptoms:   Denies abdominal pain, heartburn, acid reflux, nausea, vomiting, hematemesis, dysphagia, odynophagia, hematochezia, melena, early satiety, belching, and bloating.   Associated Symptoms: Denies chronic sore throat, cough, hoarseness, wheezing.   Medication: Prilosec as needed.      Osteoporosis Follow-Up: The patient is here for follow-up of Osteoporosis, which has been stable.   Interval Events: She sees Dr. Hopson, Endocrinology at .  Last DEXA scan done 1/29/2024 showed Osteoporosis lumbar spine and hip, unchanged, per patient report.  On 1/16/2025, Vitamin D level was 64.7  Symptoms: Has chronic back pain as above.  Denies arthralgias, myalgias, neck pain, hip pain, wrist/hand pain, paresthesias, loss of balance, and gait instability.   Medication: She is currently taking Vitamin D 3, and receives her Calcium from diet.  She is off the Multivitamin.      Sharon Turner is a 59 y.o. female who presents for an Annual Physical.  Fasting labs were done on 1/16/2025.    PMH, PSH, SocHx, FamHx, Allergies, and Medications: Reviewed and updated.    Outpatient Medications Prior to Visit   Medication Sig Dispense Refill    Calcium Carbonate-Vit D-Min (Calcium 600+D3 Plus Minerals) 600-800 MG-UNIT chewable tablet daily (contains vitamin d)      famciclovir (FAMVIR) 500 MG tablet 1 tablet Daily.      omeprazole (priLOSEC) 20 MG capsule TAKE 1 CAPSULE BY MOUTH ONCE DAILY AS NEEDED FOR ABDOMINAL PAIN AND FOR HEART 90 capsule 3    vitamin D (ERGOCALCIFEROL) 1.25 MG (42332 UT) capsule capsule Take 1 capsule by mouth 1 (One) Time Per Week.      cyclobenzaprine (FLEXERIL) 10 MG tablet Take 1 tablet by mouth 2 (Two) Times a Day As Needed for Muscle Spasms. (Patient not taking: Reported on 1/23/2025) 45 tablet 0    estradiol (VAGIFEM) 10 MCG tablet vaginal tablet  (Patient not taking: Reported on 1/23/2025)       No facility-administered medications prior to visit.       Immunization History   Administered Date(s) Administered     COVID-19 (MODERNA) 1st,2nd,3rd Dose Monovalent 12/23/2020, 01/22/2021, 11/15/2021    COVID-19 (PFIZER) BIVALENT 12+YRS 05/28/2023    Flu Vaccine Quad PF >36MO 10/30/2020    Fluzone  >6mos 01/10/2025    Hepatitis A 08/13/2020, 12/08/2021    Influenza, Unspecified 11/01/2020, 10/01/2021, 12/01/2022    Td (TDVAX) 10/22/2024    Tdap 05/14/2008, 10/07/2019         Patient Active Problem List   Diagnosis    Raynaud's disease    GERD (gastroesophageal reflux disease)    Vitamin D deficiency    History of nephrolithiasis    Herpes simplex type 2 infection    Osteoporosis    Visual problems    Chronic pain    Personal history of colon polyps, unspecified       Health Habits:  Dental Exam. up to date  Eye Exam. up to date  Hearing Loss:  No  Exercise: 7 times/week.  Current exercise activities include: walking  Diet: Healthy  Multivitamin: No    Safe Driving:  Yes  Seat Belt:  Yes  Bike Helmet:  N/A  Skin Screening:  Yes  Sunscreen: Yes  SBE / DEVONTE: Yes  Sexual Activity:  Not currently  Birth Control:  Menopause  STD Prevention:  N/A    Last Pap: 1/2/2024, normal (GYN)  Last Mammogram: 2/23/2024, category 1.  Last DEXA Scan: 1/29/2024, osteoporosis.  Last Colonoscopy: 5/10/2019, mild sigmoid diverticulosis  Last PSA: N/A    Social:    Social History     Socioeconomic History    Marital status:     Number of children: 1   Tobacco Use    Smoking status: Never     Passive exposure: Past (childhood)    Smokeless tobacco: Never   Vaping Use    Vaping status: Never Used   Substance and Sexual Activity    Alcohol use: Yes     Comment: 1-2 drinks (beer or cocktail) per week    Drug use: Never    Sexual activity: Not Currently     Partners: Male     Birth control/protection: Post-menopausal         Current Medical Providers:    Mirta Mack MD (Internal Medicine / Pediatrics)    [facility] providers who are involved in the care of this patient are listed above.         Review of Systems   Constitutional:  Negative for  appetite change, chills, fatigue, fever and unexpected weight change.        No night sweats.    HENT:  Negative for congestion, ear discharge, ear pain, facial swelling, hearing loss, nosebleeds, postnasal drip, rhinorrhea, sinus pressure, sinus pain, sneezing, sore throat, tinnitus and voice change.         Reports occasional snoring.   Eyes:  Negative for photophobia, pain, discharge, redness, itching and visual disturbance.        Intermittent left swollen upper eyelid   Respiratory:  Negative for apnea, cough, chest tightness, shortness of breath and wheezing.         No chest congestion.  No hemoptysis.   Cardiovascular:  Negative for chest pain, palpitations and leg swelling.        No orthopnea, STEVENS, or PND.  No claudication or syncope.   Gastrointestinal:  Negative for abdominal pain, blood in stool, constipation, diarrhea, nausea, rectal pain and vomiting.        No melena.  No hematemesis.  No heartburn, dysphagia or odynophagia.  No early satiety, belching, or bloating.    Endocrine: Negative for cold intolerance, heat intolerance, polydipsia, polyphagia and polyuria.        No hair loss or dry skin.  No hot flashes.     Genitourinary:  Negative for difficulty urinating, dyspareunia, dysuria, flank pain, frequency, hematuria, pelvic pain, urgency, vaginal bleeding and vaginal discharge.        No nocturia, incomplete emptying, or incontinence.   Musculoskeletal:  Positive for back pain. Negative for arthralgias, gait problem, joint swelling, myalgias, neck pain and neck stiffness.        No joint stiffness.   Skin:  Negative for rash.        No new skin lesions or changes in skin lesions. No breast pain or masses.  No nipple discharge or nipple inversion.   Neurological:  Negative for dizziness, tremors, syncope, speech difficulty, weakness, light-headedness, numbness and headaches.        No tingling.  No memory loss.  No decreased concentration.   Hematological:  Negative for adenopathy. Does not  "bruise/bleed easily.   Psychiatric/Behavioral:  Negative for confusion, self-injury, sleep disturbance and suicidal ideas. The patient is not nervous/anxious.         No depression.           Objective     Vitals:    01/23/25 1143   BP: 108/58   BP Location: Right arm   Patient Position: Sitting   Cuff Size: Adult   Pulse: 64   Resp: 18   Temp: 97.3 °F (36.3 °C)   TempSrc: Temporal   Weight: 56.4 kg (124 lb 6 oz)   Height: 170.2 cm (67.01\")   PainSc:   3   PainLoc: Shoulder       Body mass index is 19.48 kg/m².    Physical Exam    PHQ-2 Depression Screening  Little interest or pleasure in doing things? Not at all   Feeling down, depressed, or hopeless? Not at all   PHQ-2 Total Score 0         Counseling was given to patient for the following topics: appropriate exercise, healthy eating habits, disease prevention, risk factors for cancer, importance of self breast exam and breast health, importance of immunizations, including risks and benefits, bone health, sun safety, seatbelt use, and safe driving. Also discussed the importance of regular dental and vision care, as well recommendation for a yearly screening skin exam after age 40.  Written information provided to patient on these topics and other health maintenance issues.        Diagnoses and all orders for this visit:    1. Encounter for health maintenance examination in adult (Primary)    2. Chronic right shoulder pain  -     XR Shoulder 2+ View Right; Future  -     Ambulatory Referral to Orthopedic Surgery   Continue current medication(s) as noted in the history of present illness.   Continue PT.    3. Other osteoporosis without current pathological fracture   Continue Calcium (diet) and Vitamin D supplementation, as well as weight bearing exercises.   Follow-up per  endocrinology.   Labs done 1/15/2025 discussed with the patient during the visit.    4. Gastroesophageal reflux disease, unspecified whether esophagitis present   Continue current medication(s) " as noted in the history of present illness.    5. Personal history of colon polyps, unspecified  -     Ambulatory Referral For Screening Colonoscopy    6. Vitamin D deficiency   Continue Vitamin D supplementation.   Labs done 1/15/2025 discussed with the patient during the visit.    7. Chronic low back pain, unspecified back pain laterality, unspecified whether sciatica present   Continue current medication(s) as noted in the history of present illness.   Continue PT.    8. Screening for colon cancer  -     Ambulatory Referral For Screening Colonoscopy      I recommended Shingrix (Shingles vaccine) at the pharmacy.      I recommended the influenza vaccine and the COVID-19 bivalent vaccine in our office today. The patient declined.  The patient was informed she could be hospitalized and die from Influenza and/or COVID-19 infection.  She states she may do it later.        BMI is within normal parameters. No other follow-up for BMI required.            Return in about 1 year (around 1/23/2026) for Annual physical, fasting.

## 2025-01-23 NOTE — TELEPHONE ENCOUNTER
Caller: [facility] - BRENDAN    Relationship to patient:     Best call back number: [phone]     BRENDAN IS CALLING TO GET A CPT CODE FOR AN X-RAY OF HER RIGHT SHOULDER.

## 2025-02-03 DIAGNOSIS — E55.9 VITAMIN D DEFICIENCY: Primary | ICD-10-CM

## 2025-02-03 RX ORDER — ERGOCALCIFEROL 1.25 MG/1
50000 CAPSULE, LIQUID FILLED ORAL WEEKLY
Qty: 5 CAPSULE | Refills: 11 | Status: SHIPPED | OUTPATIENT
Start: 2025-02-03

## 2025-08-06 ENCOUNTER — OFFICE VISIT (OUTPATIENT)
Dept: INTERNAL MEDICINE | Facility: CLINIC | Age: 60
End: 2025-08-06
Payer: COMMERCIAL

## 2025-08-06 VITALS
HEART RATE: 72 BPM | BODY MASS INDEX: 19.57 KG/M2 | SYSTOLIC BLOOD PRESSURE: 112 MMHG | WEIGHT: 125 LBS | DIASTOLIC BLOOD PRESSURE: 66 MMHG | RESPIRATION RATE: 16 BRPM | TEMPERATURE: 97.8 F

## 2025-08-06 DIAGNOSIS — M54.42 ACUTE MIDLINE LOW BACK PAIN WITH LEFT-SIDED SCIATICA: Primary | ICD-10-CM

## 2025-08-06 PROCEDURE — 99213 OFFICE O/P EST LOW 20 MIN: CPT | Performed by: INTERNAL MEDICINE

## 2025-08-06 RX ORDER — CYCLOBENZAPRINE HCL 10 MG
10 TABLET ORAL NIGHTLY PRN
Qty: 30 TABLET | Refills: 1 | Status: SHIPPED | OUTPATIENT
Start: 2025-08-06

## 2025-08-06 RX ORDER — ESTRADIOL 10 UG/1
1 TABLET, FILM COATED VAGINAL 2 TIMES WEEKLY
COMMUNITY
Start: 2025-07-28

## 2025-08-06 RX ORDER — PREDNISONE 20 MG/1
TABLET ORAL
Qty: 11 TABLET | Refills: 0 | Status: SHIPPED | OUTPATIENT
Start: 2025-08-06